# Patient Record
Sex: FEMALE | Race: WHITE | NOT HISPANIC OR LATINO | Employment: UNEMPLOYED | URBAN - METROPOLITAN AREA
[De-identification: names, ages, dates, MRNs, and addresses within clinical notes are randomized per-mention and may not be internally consistent; named-entity substitution may affect disease eponyms.]

---

## 2018-10-12 ENCOUNTER — NON-PROVIDER VISIT (OUTPATIENT)
Dept: OCCUPATIONAL MEDICINE | Facility: CLINIC | Age: 38
End: 2018-10-12

## 2018-10-12 DIAGNOSIS — Z11.1 ENCOUNTER FOR PPD TEST: Primary | ICD-10-CM

## 2018-10-12 PROCEDURE — 86580 TB INTRADERMAL TEST: CPT | Performed by: PREVENTIVE MEDICINE

## 2018-10-15 ENCOUNTER — NON-PROVIDER VISIT (OUTPATIENT)
Dept: OCCUPATIONAL MEDICINE | Facility: CLINIC | Age: 38
End: 2018-10-15

## 2018-10-15 LAB — TB WHEAL 3D P 5 TU DIAM: NORMAL MM

## 2018-10-15 NOTE — PROGRESS NOTES
Martine Godinez is a 38 y.o. female here for a non-provider visit for PPD reading -- Step 1 of 2.      1.  Resulted in Epic under enter/edit results? Yes   2.  TB evaluation questionnaire scanned into chart and original given to patient?No      3. Was induration greater than 0 mm? No.

## 2018-10-19 ENCOUNTER — NON-PROVIDER VISIT (OUTPATIENT)
Dept: OCCUPATIONAL MEDICINE | Facility: CLINIC | Age: 38
End: 2018-10-19

## 2018-10-19 DIAGNOSIS — Z11.1 ENCOUNTER FOR PPD TEST: ICD-10-CM

## 2018-10-19 PROCEDURE — 86580 TB INTRADERMAL TEST: CPT | Performed by: PREVENTIVE MEDICINE

## 2018-10-22 ENCOUNTER — NON-PROVIDER VISIT (OUTPATIENT)
Dept: OCCUPATIONAL MEDICINE | Facility: CLINIC | Age: 38
End: 2018-10-22

## 2018-10-23 LAB — TB WHEAL 3D P 5 TU DIAM: NORMAL MM

## 2020-11-19 ENCOUNTER — HOSPITAL ENCOUNTER (OUTPATIENT)
Facility: MEDICAL CENTER | Age: 40
End: 2020-11-19
Attending: SURGERY
Payer: MEDICAID

## 2020-11-20 LAB
FORWARD REASON: SPWHY: NORMAL
FORWARDED TO LAB: SPWHR: NORMAL
SPECIMEN SENT: SPWT1: NORMAL

## 2022-08-18 ENCOUNTER — HOSPITAL ENCOUNTER (EMERGENCY)
Facility: MEDICAL CENTER | Age: 42
End: 2022-08-19
Attending: STUDENT IN AN ORGANIZED HEALTH CARE EDUCATION/TRAINING PROGRAM
Payer: COMMERCIAL

## 2022-08-18 DIAGNOSIS — D72.829 LEUKOCYTOSIS, UNSPECIFIED TYPE: ICD-10-CM

## 2022-08-18 DIAGNOSIS — E87.6 HYPOKALEMIA: ICD-10-CM

## 2022-08-18 DIAGNOSIS — G44.89 OTHER HEADACHE SYNDROME: ICD-10-CM

## 2022-08-18 DIAGNOSIS — I10 HYPERTENSION, UNSPECIFIED TYPE: ICD-10-CM

## 2022-08-18 LAB — EKG IMPRESSION: NORMAL

## 2022-08-18 PROCEDURE — 93005 ELECTROCARDIOGRAM TRACING: CPT

## 2022-08-18 PROCEDURE — 99284 EMERGENCY DEPT VISIT MOD MDM: CPT

## 2022-08-18 PROCEDURE — 93005 ELECTROCARDIOGRAM TRACING: CPT | Performed by: STUDENT IN AN ORGANIZED HEALTH CARE EDUCATION/TRAINING PROGRAM

## 2022-08-19 ENCOUNTER — APPOINTMENT (OUTPATIENT)
Dept: RADIOLOGY | Facility: MEDICAL CENTER | Age: 42
End: 2022-08-19
Attending: STUDENT IN AN ORGANIZED HEALTH CARE EDUCATION/TRAINING PROGRAM
Payer: COMMERCIAL

## 2022-08-19 VITALS
RESPIRATION RATE: 16 BRPM | HEART RATE: 62 BPM | TEMPERATURE: 98.4 F | SYSTOLIC BLOOD PRESSURE: 149 MMHG | HEIGHT: 65 IN | OXYGEN SATURATION: 96 % | WEIGHT: 293 LBS | DIASTOLIC BLOOD PRESSURE: 72 MMHG | BODY MASS INDEX: 48.82 KG/M2

## 2022-08-19 LAB
ALBUMIN SERPL BCP-MCNC: 4.3 G/DL (ref 3.2–4.9)
ALBUMIN/GLOB SERPL: 1.4 G/DL
ALP SERPL-CCNC: 48 U/L (ref 30–99)
ALT SERPL-CCNC: 20 U/L (ref 2–50)
ANION GAP SERPL CALC-SCNC: 12 MMOL/L (ref 7–16)
AST SERPL-CCNC: 24 U/L (ref 12–45)
BILIRUB SERPL-MCNC: 0.5 MG/DL (ref 0.1–1.5)
BUN SERPL-MCNC: 10 MG/DL (ref 8–22)
CALCIUM SERPL-MCNC: 9.6 MG/DL (ref 8.5–10.5)
CHLORIDE SERPL-SCNC: 101 MMOL/L (ref 96–112)
CO2 SERPL-SCNC: 27 MMOL/L (ref 20–33)
CREAT SERPL-MCNC: 0.68 MG/DL (ref 0.5–1.4)
ERYTHROCYTE [DISTWIDTH] IN BLOOD BY AUTOMATED COUNT: 38.1 FL (ref 35.9–50)
GFR SERPLBLD CREATININE-BSD FMLA CKD-EPI: 113 ML/MIN/1.73 M 2
GLOBULIN SER CALC-MCNC: 3.1 G/DL (ref 1.9–3.5)
GLUCOSE SERPL-MCNC: 104 MG/DL (ref 65–99)
HCT VFR BLD AUTO: 44 % (ref 37–47)
HGB BLD-MCNC: 15.2 G/DL (ref 12–16)
MCH RBC QN AUTO: 28 PG (ref 27–33)
MCHC RBC AUTO-ENTMCNC: 34.5 G/DL (ref 33.6–35)
MCV RBC AUTO: 81.2 FL (ref 81.4–97.8)
PLATELET # BLD AUTO: 300 K/UL (ref 164–446)
PMV BLD AUTO: 9.4 FL (ref 9–12.9)
POTASSIUM SERPL-SCNC: 3.5 MMOL/L (ref 3.6–5.5)
PROT SERPL-MCNC: 7.4 G/DL (ref 6–8.2)
RBC # BLD AUTO: 5.42 M/UL (ref 4.2–5.4)
SODIUM SERPL-SCNC: 140 MMOL/L (ref 135–145)
TROPONIN T SERPL-MCNC: <6 NG/L (ref 6–19)
WBC # BLD AUTO: 11.1 K/UL (ref 4.8–10.8)

## 2022-08-19 PROCEDURE — 70450 CT HEAD/BRAIN W/O DYE: CPT

## 2022-08-19 PROCEDURE — 71045 X-RAY EXAM CHEST 1 VIEW: CPT

## 2022-08-19 PROCEDURE — 80053 COMPREHEN METABOLIC PANEL: CPT

## 2022-08-19 PROCEDURE — A9270 NON-COVERED ITEM OR SERVICE: HCPCS | Performed by: STUDENT IN AN ORGANIZED HEALTH CARE EDUCATION/TRAINING PROGRAM

## 2022-08-19 PROCEDURE — 85027 COMPLETE CBC AUTOMATED: CPT

## 2022-08-19 PROCEDURE — 700111 HCHG RX REV CODE 636 W/ 250 OVERRIDE (IP): Performed by: STUDENT IN AN ORGANIZED HEALTH CARE EDUCATION/TRAINING PROGRAM

## 2022-08-19 PROCEDURE — 96374 THER/PROPH/DIAG INJ IV PUSH: CPT

## 2022-08-19 PROCEDURE — 36415 COLL VENOUS BLD VENIPUNCTURE: CPT

## 2022-08-19 PROCEDURE — 700102 HCHG RX REV CODE 250 W/ 637 OVERRIDE(OP): Performed by: STUDENT IN AN ORGANIZED HEALTH CARE EDUCATION/TRAINING PROGRAM

## 2022-08-19 PROCEDURE — 84484 ASSAY OF TROPONIN QUANT: CPT

## 2022-08-19 RX ORDER — ACETAMINOPHEN 325 MG/1
650 TABLET ORAL ONCE
Status: COMPLETED | OUTPATIENT
Start: 2022-08-19 | End: 2022-08-19

## 2022-08-19 RX ORDER — AMLODIPINE BESYLATE 5 MG/1
5 TABLET ORAL ONCE
Status: COMPLETED | OUTPATIENT
Start: 2022-08-19 | End: 2022-08-19

## 2022-08-19 RX ORDER — LISINOPRIL 20 MG/1
40 TABLET ORAL ONCE
Status: COMPLETED | OUTPATIENT
Start: 2022-08-19 | End: 2022-08-19

## 2022-08-19 RX ORDER — AMLODIPINE BESYLATE 5 MG/1
5 TABLET ORAL DAILY
Qty: 30 TABLET | Refills: 0 | Status: SHIPPED | OUTPATIENT
Start: 2022-08-19 | End: 2023-08-10

## 2022-08-19 RX ORDER — ONDANSETRON 2 MG/ML
4 INJECTION INTRAMUSCULAR; INTRAVENOUS ONCE
Status: COMPLETED | OUTPATIENT
Start: 2022-08-19 | End: 2022-08-19

## 2022-08-19 RX ADMIN — AMLODIPINE BESYLATE 5 MG: 5 TABLET ORAL at 00:24

## 2022-08-19 RX ADMIN — ACETAMINOPHEN 650 MG: 325 TABLET, FILM COATED ORAL at 00:24

## 2022-08-19 RX ADMIN — ONDANSETRON 4 MG: 2 INJECTION INTRAMUSCULAR; INTRAVENOUS at 00:31

## 2022-08-19 RX ADMIN — LISINOPRIL 40 MG: 20 TABLET ORAL at 00:24

## 2022-08-19 NOTE — DISCHARGE INSTRUCTIONS
Your labs and CT scan were all normal.  Please follow-up with your primary doctor this week discuss medication for your high blood pressure.  Please return to the emergency department if you have worsening headache, chest pain, shortness of breath or any new or worsening concern.

## 2022-08-19 NOTE — ED PROVIDER NOTES
CHIEF COMPLAINT  Chief Complaint   Patient presents with    Hypertension     Pt has hx of hypertension and was restarted on medication on August 4th, states she hasn't missed any doses. Pt states she has had a headache all day and took her BP at home and it was 180/111. Pt denies CP, SOB and N/V.        HPI  Martine Godinez is a 42 y.o. female who has a history of hypertension presents for evaluation of hypertension.     She states that she has had a mild headache all day since about noon.  It started in the back of her head and radiates down her neck.  It was not maximal at onset.  No associated vomiting or photophobia.  States she does feel slightly nauseated.  She now describes it as more frontal in nature, constant and has worsened somewhat since 6 PM but is coming and going in severity.  It is dull in nature.  She also has had about 1 hour of sharp left-sided chest pain which does not radiate to her arm or back.  She has had this pain once before a couple months ago and resolved without intervention.  She denies any associated shortness of breath, vomiting or diaphoresis.  It is not worse with exertion.  States that she checked her blood pressure this evening and noted that it was above 180 systolic.  She checked it multiple times and it was persistently elevated.  She is taking lisinopril 40 mg daily but did not take her evening dose.    Denies numbness, unilateral weakness, facial asymmetry, speech changes.  No dysphagia, dysarthria, diplopia. No difficulty with gait or coordination.      REVIEW OF SYSTEMS  As per HPI, otherwise a 10 point review of systems is negative    PAST MEDICAL HISTORY  Past Medical History:   Diagnosis Date    ADHD (attention deficit hyperactivity disorder)     Hypertension     Migraine     Psychiatric disorder Bipolar       SOCIAL HISTORY  Social History     Tobacco Use    Smoking status: Former     Packs/day: 1.25     Years: 12.00     Pack years: 15.00     Types: Cigarettes      "Quit date: 2011     Years since quittin.3    Smokeless tobacco: Never    Tobacco comments:     1ppd   Substance Use Topics    Alcohol use: No    Drug use: Yes     Types: Inhaled     Comment: marijuana       SURGICAL HISTORY  Past Surgical History:   Procedure Laterality Date    TUBAL COAGULATION LAPAROSCOPIC BILATERAL  2011    [facility]    SOFIA BY LAPAROSCOPY  10/4/08    [facility] W[facility]IM[facility]EL [facility]    [place] REMOVAL OF TONSILS,<11 Y/O         CURRENT MEDICATIONS  Home Medications       Reviewed by Martha Galvez R.N. (Registered Nurse) on 22 at 0259  Med List Status: Not Addressed     Medication Last Dose Status   gabapentin (NEURONTIN) 400 MG CAPS  Active   insulin aspart (NOVOLOG) 100 UNIT/ML SOLN  Active   insulin aspart (NOVOLOG) 100 UNIT/ML SOLN  Active   Insulin Aspart Prot & Aspart (70-30) 100 UNIT/ML SUPN  Active   insulin glargine (LANTUS) 100 UNIT/ML SOLN  Active   insulin glargine (LANTUS) 100 UNIT/ML SOPN injection  Active   lisinopril (PRINIVIL) 20 MG TABS  Active                    ALLERGIES  Allergies   Allergen Reactions    Cefzil [Cefprozil] Hives    Morphine Vomiting    Sulfa Drugs Hives    Tegretol [Carbamazepine] Rash and Itching       PHYSICAL EXAM  VITAL SIGNS: BP (!) 178/100   Pulse 71   Temp 36.3 °C (97.3 °F) (Temporal)   Resp 20   Ht 1.651 m (5' 5\")   Wt (!) 146 kg (320 lb 15.8 oz)   SpO2 97%   BMI 53.42 kg/m²    Constitutional: Awake and alert . Well appearing   HENT: Normal inspection  Eyes: Normal inspection  Neck: Grossly normal range of motion.  Cardiovascular: Normal heart rate, Normal rhythm.  Symmetric peripheral pulses.   Thorax & Lungs: No respiratory distress, No wheezing, No rales, No rhonchi, No chest tenderness.   Abdomen: Soft, non-distended, nontender, no mass  Skin: No obvious rash.  Back: No tenderness, No CVA tenderness.   Extremities: Warm, well perfused. No " clubbing, cyanosis, edema,   Neurologic: CN II-XII tested and intact.  Sensation intact to light touch in all extremities.  Motor: Normal tone and bulk. No abnormal movements appreciated. No pronator drift. Strength tested and 5/5 in bilateral wrist flexion/extension, elbow flexion/extension, shoulder abduction, straight leg raise, knee flexion/extension, ankle dorsiflexion/plantarflexion. Patient ambulates with a steady gait.  Coordination: Finger to nose and heel to shin testing intact bilaterally.  Psychiatric: Normal for situation    RADIOLOGY/PROCEDURES  CT-HEAD W/O   Final Result         1.  No acute intracranial abnormality.         DX-CHEST-PORTABLE (1 VIEW)   Final Result         1.  Left basilar atelectasis, no focal infiltrate           Imaging is interpreted by radiologist    Labs:  Results for orders placed or performed during the hospital encounter of 08/18/22   CBC WITHOUT DIFFERENTIAL   Result Value Ref Range    WBC 11.1 (H) 4.8 - 10.8 K/uL    RBC 5.42 (H) 4.20 - 5.40 M/uL    Hemoglobin 15.2 12.0 - 16.0 g/dL    Hematocrit 44.0 37.0 - 47.0 %    MCV 81.2 (L) 81.4 - 97.8 fL    MCH 28.0 27.0 - 33.0 pg    MCHC 34.5 33.6 - 35.0 g/dL    RDW 38.1 35.9 - 50.0 fL    Platelet Count 300 164 - 446 K/uL    MPV 9.4 9.0 - 12.9 fL   COMP METABOLIC PANEL   Result Value Ref Range    Sodium 140 135 - 145 mmol/L    Potassium 3.5 (L) 3.6 - 5.5 mmol/L    Chloride 101 96 - 112 mmol/L    Co2 27 20 - 33 mmol/L    Anion Gap 12.0 7.0 - 16.0    Glucose 104 (H) 65 - 99 mg/dL    Bun 10 8 - 22 mg/dL    Creatinine 0.68 0.50 - 1.40 mg/dL    Calcium 9.6 8.5 - 10.5 mg/dL    AST(SGOT) 24 12 - 45 U/L    ALT(SGPT) 20 2 - 50 U/L    Alkaline Phosphatase 48 30 - 99 U/L    Total Bilirubin 0.5 0.1 - 1.5 mg/dL    Albumin 4.3 3.2 - 4.9 g/dL    Total Protein 7.4 6.0 - 8.2 g/dL    Globulin 3.1 1.9 - 3.5 g/dL    A-G Ratio 1.4 g/dL   TROPONIN   Result Value Ref Range    Troponin T <6 6 - 19 ng/L   ESTIMATED GFR   Result Value Ref Range    GFR  (CKD-EPI) 113 >60 mL/min/1.73 m 2   EKG (NOW)   Result Value Ref Range    Report       [facility] Emergency Dept.    Test Date:  2022  Pt Name:    YASMIN DOWNS Department: ER  MRN:        [id number]                      Room:  Gender:     Female                       Technician: [id number]  :        1980                   Requested By:ER TRIAGE PROTOCOL  Order #:    [id number]                    Reading MD:    Measurements  Intervals                                Axis  Rate:       64                           P:          46  PA:         173                          QRS:        64  QRSD:       106                          T:          43  QT:         456  QTc:        471    Interpretive Statements  Sinus rhythm  RSR' in V1 or V2, probably normal variant  ST elev, probable normal early repol pattern  Baseline wander in lead(s) V1  Compared to ECG 2011 18:32:47  RSR' in V1 or V2 now present  ST (T wave) deviation now present  Inferior Q waves no longer present  Q waves no longer present       The EKG was reviewed by me and interpreted as documented above      Medications   acetaminophen (Tylenol) tablet 650 mg (650 mg Oral Given 22)   lisinopril (PRINIVIL) tablet 40 mg (40 mg Oral Given 22)   amLODIPine (NORVASC) tablet 5 mg (5 mg Oral Given 22)   ondansetron (ZOFRAN) syringe/vial injection 4 mg (4 mg Intravenous Given 22)       COURSE & MEDICAL DECISION MAKING  12:00 AM Patient was evaluated at bedside. Ordered for an EKG, CT-Head, DX-Chest, Troponin, CMP, and CBC with diff. Patient will be treated with tylenol 650 mg for her headache. She will be given amlodipine 5 mg tablet and lisinopril 40 mg tablet for her hypertension. Zofran 4 mg was given for her nausea. I informed the patient of my plan to run diagnostic studies to evaluate their symptoms. Patient verbalizes understanding and support with my plan of care.     This is a  42-year-old with a history of hypertension who presents with headache, chest pain and elevated blood pressure readings at home.  She does arrive hypertensive but is well-appearing with a normal mental status.  Neurological exam is all normal.  I overall have low suspicion for subarachnoid hemorrhage given that her headache was not maximal at onset and is only mild currently.  CT head obtained without evidence of intracranial hemorrhage and I do not see an indication for advanced vessel imaging or LP at this time.  The remainder of her labs are reassuring. EKG is not ischemic.  She is not exhibiting any signs or symptoms to suggest hypertensive emergency including but not limited to  ACS, aortic pathology, renal injury or heart failure. Pulmonary embolism considered but unlikely in this clinical context. Patient was given her evening dose of lisinopril in addition to 5 mg of amlodipine.  On reevaluation their BP has improved to the 140s systolic she feels overall improved.  I stressed the importance of close PCP follow-up in order to assist with ongoing BP management. They express understanding or strict ER return precautions.     FINAL IMPRESSION  1. Hypokalemia Acute       2. Other headache syndrome Acute       3. Hypertension, unspecified type Acute amLODIPine (NORVASC) 5 MG Tab      4. Leukocytosis, unspecified type                This dictation was created using voice recognition software. The accuracy of the dictation is limited to the abilities of the software.  The nursing notes were reviewed and certain aspects of this information were incorporated into this note.      Electronically signed by: Crystal Caban, 8/19/2022 12:05 AM

## 2022-08-19 NOTE — ED TRIAGE NOTES
"Chief Complaint   Patient presents with    Hypertension     Pt has hx of hypertension and was restarted on medication on August 4th, states she hasn't missed any doses. Pt states she has had a headache all day and took her BP at home and it was 180/111. Pt denies CP, SOB and N/V.        43 yo F to triage for above complaint.     Pt is alert and oriented, speaking in full sentences, follows commands and responds appropriately to questions. NAD. Resp are even and unlabored.      Pt placed in lobby. Pt educated on triage process. Pt encouraged to alert staff for any changes.     Patient and staff wearing appropriate PPE    BP (!) 188/107   Pulse 94   Temp 36.3 °C (97.3 °F) (Temporal)   Resp 18   Ht 1.651 m (5' 5\")   Wt (!) 146 kg (320 lb 15.8 oz)   SpO2 97%   BMI 53.42 kg/m²     "

## 2022-11-22 ENCOUNTER — OFFICE VISIT (OUTPATIENT)
Dept: URGENT CARE | Facility: CLINIC | Age: 42
End: 2022-11-22
Payer: COMMERCIAL

## 2022-11-22 VITALS
BODY MASS INDEX: 48.82 KG/M2 | WEIGHT: 293 LBS | HEIGHT: 65 IN | HEART RATE: 96 BPM | TEMPERATURE: 97 F | SYSTOLIC BLOOD PRESSURE: 176 MMHG | RESPIRATION RATE: 16 BRPM | DIASTOLIC BLOOD PRESSURE: 90 MMHG | OXYGEN SATURATION: 96 %

## 2022-11-22 DIAGNOSIS — I10 HYPERTENSION, UNSPECIFIED TYPE: ICD-10-CM

## 2022-11-22 DIAGNOSIS — R09.81 SINUS CONGESTION: ICD-10-CM

## 2022-11-22 DIAGNOSIS — J01.00 ACUTE MAXILLARY SINUSITIS, RECURRENCE NOT SPECIFIED: ICD-10-CM

## 2022-11-22 DIAGNOSIS — R05.2 SUBACUTE COUGH: ICD-10-CM

## 2022-11-22 PROCEDURE — 99203 OFFICE O/P NEW LOW 30 MIN: CPT | Performed by: PHYSICIAN ASSISTANT

## 2022-11-22 RX ORDER — DEXTROMETHORPHAN HYDROBROMIDE AND PROMETHAZINE HYDROCHLORIDE 15; 6.25 MG/5ML; MG/5ML
5 SYRUP ORAL EVERY 4 HOURS PRN
Qty: 120 ML | Refills: 0 | Status: SHIPPED | OUTPATIENT
Start: 2022-11-22 | End: 2023-04-24

## 2022-11-22 RX ORDER — AMOXICILLIN AND CLAVULANATE POTASSIUM 875; 125 MG/1; MG/1
1 TABLET, FILM COATED ORAL 2 TIMES DAILY
Qty: 20 TABLET | Refills: 0 | Status: SHIPPED | OUTPATIENT
Start: 2022-11-22 | End: 2023-04-24

## 2022-11-22 RX ORDER — BENZONATATE 100 MG/1
100 CAPSULE ORAL 3 TIMES DAILY PRN
Qty: 60 CAPSULE | Refills: 0 | Status: SHIPPED | OUTPATIENT
Start: 2022-11-22 | End: 2023-04-24

## 2022-11-22 RX ORDER — METHYLPREDNISOLONE 4 MG/1
TABLET ORAL
Qty: 1 EACH | Refills: 0 | Status: SHIPPED | OUTPATIENT
Start: 2022-11-22 | End: 2023-04-24

## 2022-11-22 ASSESSMENT — ENCOUNTER SYMPTOMS
CHILLS: 0
NAUSEA: 0
COUGH: 1
SPUTUM PRODUCTION: 1
FEVER: 0
SHORTNESS OF BREATH: 0
ABDOMINAL PAIN: 0
DIARRHEA: 0
SORE THROAT: 0
SINUS PAIN: 1
WHEEZING: 0
VOMITING: 0

## 2022-11-22 ASSESSMENT — FIBROSIS 4 INDEX: FIB4 SCORE: 0.72

## 2022-11-22 NOTE — PROGRESS NOTES
Subjective:     Martine Godinez  is a 42 y.o. female who presents for Cough (Pt has a cough, congestion, ear discomfort x 2 weeks getting worse )      Cough  Pertinent negatives include no chest pain, chills, ear pain, fever, rash, sore throat, shortness of breath or wheezing. Her past medical history is significant for environmental allergies. Patient presents urgent care noting last 2 to 3 weeks of sinus pressure and congestion concerning for sinusitis.  Patient notes past medical history of sinusitis and suspects once again.  Denies fevers or chills.  Complains of fullness in the right greater than left ear.  Denies ear drainage.  Notes past medical history of allergic rhinitis.  Patient planes of sinus pressure pain.  Notes purulent nasal drainage as well as productive coughing.  Patient denies nausea vomiting abdominal pain diarrhea or rash.  Patient is initially found to be hypertensive in clinic today.  She notes past medical history of hypertension has been managed with medications.  She is currently off her meds for just over 1 week.  She is recently returned to [facility] having had a number of personal and family health issues.  She is yet awaiting picking up her blood pressure medication.  Patient denies headaches visual changes chest pain or shortness of breath.    Review of Systems   Constitutional:  Negative for chills and fever.   HENT:  Positive for congestion and sinus pain. Negative for ear pain and sore throat.    Respiratory:  Positive for cough and sputum production. Negative for shortness of breath and wheezing.    Cardiovascular:  Negative for chest pain.   Gastrointestinal:  Negative for abdominal pain, diarrhea, nausea and vomiting.   Skin:  Negative for rash.   Endo/Heme/Allergies:  Positive for environmental allergies.     Medications:    amLODIPine Tabs  gabapentin Caps  Insulin Aspart Prot & Aspart Supn  insulin aspart Soln  insulin glargine Soln  insulin glargine Sopn  lisinopril  "Tabs    Allergies: Carbamazepine, Cefzil [cefprozil], Morphine, and Sulfa drugs    Problem List: Martine Godinez does not have a problem list on file.    Surgical History:  Past Surgical History:   Procedure Laterality Date    TUBAL COAGULATION LAPAROSCOPIC BILATERAL  9/2/2011    Per[facility] KEMI GREGG [facility]    SOFIA BY LAPAROSCOPY  10/4/08    [facility]    [place] REMOVAL OF TONSILS,<11 Y/O         Past Social Hx: Martine Godinez  reports that she quit smoking about 11 years ago. Her smoking use included cigarettes. She has a 15.00 pack-year smoking history. She has never used smokeless tobacco. She reports current drug use. Drugs: Inhaled and Marijuana. She reports that she does not drink alcohol.     Past Family Hx:  Martine Godinez family history is not on file.     Problem list, medications, and allergies reviewed by myself today in Epic.     Objective:   BP (!) 176/90 (BP Location: Left arm, Patient Position: Sitting, BP Cuff Size: Large adult)   Pulse 96   Temp 36.1 °C (97 °F) (Temporal)   Resp 16   Ht 1.651 m (5' 5\")   Wt (!) 152 kg (334 lb 3.2 oz)   SpO2 96%   BMI 55.61 kg/m²     Physical Exam  Vitals and nursing note reviewed.   Constitutional:       General: She is not in acute distress.     Appearance: She is well-developed. She is not diaphoretic.   HENT:      Head: Normocephalic and atraumatic.      Right Ear: Ear canal and external ear normal. Tympanic membrane is bulging. Tympanic membrane is not erythematous.      Left Ear: Ear canal and external ear normal. Tympanic membrane is bulging. Tympanic membrane is not erythematous.      Nose: Congestion present.      Right Sinus: Maxillary sinus tenderness present. No frontal sinus tenderness.      Left Sinus: Maxillary sinus tenderness present. No frontal sinus tenderness.      Mouth/Throat:      Lips: Pink.      Mouth: Mucous membranes are moist.      " Pharynx: Uvula midline. Posterior oropharyngeal erythema ( PND) present. No oropharyngeal exudate or uvula swelling.      Tonsils: No tonsillar abscesses.   Eyes:      General: Lids are normal. No scleral icterus.        Right eye: No discharge.         Left eye: No discharge.      Conjunctiva/sclera: Conjunctivae normal.   Cardiovascular:      Rate and Rhythm: Normal rate and regular rhythm.   Pulmonary:      Effort: Pulmonary effort is normal. No respiratory distress.      Breath sounds: Normal breath sounds. No stridor. No decreased breath sounds, wheezing, rhonchi or rales.   Musculoskeletal:         General: Normal range of motion.      Cervical back: Neck supple.   Skin:     General: Skin is warm and dry.      Coloration: Skin is not pale.      Findings: No erythema.   Neurological:      Mental Status: She is alert and oriented to person, place, and time. She is not disoriented.   Psychiatric:         Speech: Speech normal.         Behavior: Behavior normal.       Assessment/Plan:   Assessment      1. Acute maxillary sinusitis, recurrence not specified  - methylPREDNISolone (MEDROL DOSEPAK) 4 MG Tablet Therapy Pack; Take as directed on package.  Dispense one package.  Dispense: 1 Each; Refill: 0  - amoxicillin-clavulanate (AUGMENTIN) 875-125 MG Tab; Take 1 Tablet by mouth 2 times a day.  Dispense: 20 Tablet; Refill: 0    2. Sinus congestion    3. Hypertension, unspecified type    4. Subacute cough  - benzonatate (TESSALON) 100 MG Cap; Take 1 Capsule by mouth 3 times a day as needed for Cough.  Dispense: 60 Capsule; Refill: 0  - promethazine-dextromethorphan (PROMETHAZINE-DM) 6.25-15 MG/5ML syrup; Take 5 mL by mouth every four hours as needed for Cough.  Dispense: 120 mL; Refill: 0  Supportive care is reviewed with patient/caregiver - recommend to push PO fluids and electrolytes, Nsaids/tylenol, netti pot/saline irrig, humidifier in home, flonase  Cautioned regarding potential for sedation with  medication.  Cautioned regarding potential side effects of steroid, avoid nsaids while using   take full course of Rx, take with probiotics, observe for resolution  Return to clinic with lack of resolution or progression of symptoms.    I have worn an N95 mask, gloves and eye protection for the entire encounter with this patient.     Differential diagnosis, natural history, supportive care, and indications for immediate follow-up discussed.

## 2023-04-20 ENCOUNTER — APPOINTMENT (OUTPATIENT)
Dept: ADMISSIONS | Facility: MEDICAL CENTER | Age: 43
End: 2023-04-20
Attending: NEUROLOGICAL SURGERY
Payer: MEDICAID

## 2023-04-24 ENCOUNTER — PRE-ADMISSION TESTING (OUTPATIENT)
Dept: ADMISSIONS | Facility: MEDICAL CENTER | Age: 43
End: 2023-04-24
Attending: NEUROLOGICAL SURGERY
Payer: MEDICAID

## 2023-04-24 ENCOUNTER — HOSPITAL ENCOUNTER (OUTPATIENT)
Dept: RADIOLOGY | Facility: MEDICAL CENTER | Age: 43
End: 2023-04-24
Attending: NEUROLOGICAL SURGERY | Admitting: NEUROLOGICAL SURGERY
Payer: MEDICAID

## 2023-04-24 DIAGNOSIS — Z01.812 PRE-OPERATIVE LABORATORY EXAMINATION: ICD-10-CM

## 2023-04-24 DIAGNOSIS — Z01.811 PRE-OPERATIVE RESPIRATORY EXAMINATION: ICD-10-CM

## 2023-04-24 DIAGNOSIS — Z01.810 PRE-OPERATIVE CARDIOVASCULAR EXAMINATION: ICD-10-CM

## 2023-04-24 LAB
ANION GAP SERPL CALC-SCNC: 10 MMOL/L (ref 7–16)
APTT PPP: 31.7 SEC (ref 24.7–36)
BASOPHILS # BLD AUTO: 0.5 % (ref 0–1.8)
BASOPHILS # BLD: 0.04 K/UL (ref 0–0.12)
BUN SERPL-MCNC: 8 MG/DL (ref 8–22)
CALCIUM SERPL-MCNC: 9.3 MG/DL (ref 8.5–10.5)
CHLORIDE SERPL-SCNC: 105 MMOL/L (ref 96–112)
CO2 SERPL-SCNC: 26 MMOL/L (ref 20–33)
CREAT SERPL-MCNC: 0.57 MG/DL (ref 0.5–1.4)
EKG IMPRESSION: NORMAL
EOSINOPHIL # BLD AUTO: 0.23 K/UL (ref 0–0.51)
EOSINOPHIL NFR BLD: 2.7 % (ref 0–6.9)
ERYTHROCYTE [DISTWIDTH] IN BLOOD BY AUTOMATED COUNT: 42.5 FL (ref 35.9–50)
EST. AVERAGE GLUCOSE BLD GHB EST-MCNC: 128 MG/DL
GFR SERPLBLD CREATININE-BSD FMLA CKD-EPI: 117 ML/MIN/1.73 M 2
GLUCOSE SERPL-MCNC: 96 MG/DL (ref 65–99)
HBA1C MFR BLD: 6.1 % (ref 4–5.6)
HCT VFR BLD AUTO: 42.5 % (ref 37–47)
HGB BLD-MCNC: 13.9 G/DL (ref 12–16)
IMM GRANULOCYTES # BLD AUTO: 0.02 K/UL (ref 0–0.11)
IMM GRANULOCYTES NFR BLD AUTO: 0.2 % (ref 0–0.9)
INR PPP: 1.06 (ref 0.87–1.13)
LYMPHOCYTES # BLD AUTO: 3.19 K/UL (ref 1–4.8)
LYMPHOCYTES NFR BLD: 38.1 % (ref 22–41)
MCH RBC QN AUTO: 26.3 PG (ref 27–33)
MCHC RBC AUTO-ENTMCNC: 32.7 G/DL (ref 33.6–35)
MCV RBC AUTO: 80.5 FL (ref 81.4–97.8)
MONOCYTES # BLD AUTO: 0.48 K/UL (ref 0–0.85)
MONOCYTES NFR BLD AUTO: 5.7 % (ref 0–13.4)
NEUTROPHILS # BLD AUTO: 4.42 K/UL (ref 2–7.15)
NEUTROPHILS NFR BLD: 52.8 % (ref 44–72)
NRBC # BLD AUTO: 0 K/UL
NRBC BLD-RTO: 0 /100 WBC
PLATELET # BLD AUTO: 266 K/UL (ref 164–446)
PMV BLD AUTO: 10.5 FL (ref 9–12.9)
POTASSIUM SERPL-SCNC: 4.1 MMOL/L (ref 3.6–5.5)
PROTHROMBIN TIME: 13.7 SEC (ref 12–14.6)
RBC # BLD AUTO: 5.28 M/UL (ref 4.2–5.4)
SODIUM SERPL-SCNC: 141 MMOL/L (ref 135–145)
WBC # BLD AUTO: 8.4 K/UL (ref 4.8–10.8)

## 2023-04-24 PROCEDURE — 71046 X-RAY EXAM CHEST 2 VIEWS: CPT

## 2023-04-24 PROCEDURE — 85610 PROTHROMBIN TIME: CPT

## 2023-04-24 PROCEDURE — 36415 COLL VENOUS BLD VENIPUNCTURE: CPT

## 2023-04-24 PROCEDURE — 83036 HEMOGLOBIN GLYCOSYLATED A1C: CPT

## 2023-04-24 PROCEDURE — 85025 COMPLETE CBC W/AUTO DIFF WBC: CPT

## 2023-04-24 PROCEDURE — 93005 ELECTROCARDIOGRAM TRACING: CPT

## 2023-04-24 PROCEDURE — 93010 ELECTROCARDIOGRAM REPORT: CPT | Performed by: INTERNAL MEDICINE

## 2023-04-24 PROCEDURE — 85730 THROMBOPLASTIN TIME PARTIAL: CPT

## 2023-04-24 PROCEDURE — 80048 BASIC METABOLIC PNL TOTAL CA: CPT

## 2023-04-24 RX ORDER — INSULIN GLARGINE-YFGN 100 [IU]/ML
8 INJECTION, SOLUTION SUBCUTANEOUS
COMMUNITY
Start: 2023-03-30 | End: 2023-08-10

## 2023-04-24 RX ORDER — BUPROPION HYDROCHLORIDE 150 MG/1
150 TABLET, EXTENDED RELEASE ORAL 2 TIMES DAILY
Status: ON HOLD | COMMUNITY
Start: 2022-08-03 | End: 2023-08-14

## 2023-04-27 ENCOUNTER — ANESTHESIA EVENT (OUTPATIENT)
Dept: SURGERY | Facility: MEDICAL CENTER | Age: 43
End: 2023-04-27
Payer: MEDICAID

## 2023-04-27 ENCOUNTER — APPOINTMENT (OUTPATIENT)
Dept: RADIOLOGY | Facility: MEDICAL CENTER | Age: 43
End: 2023-04-27
Attending: NEUROLOGICAL SURGERY
Payer: MEDICAID

## 2023-04-27 ENCOUNTER — ANESTHESIA (OUTPATIENT)
Dept: SURGERY | Facility: MEDICAL CENTER | Age: 43
End: 2023-04-27
Payer: MEDICAID

## 2023-04-27 ENCOUNTER — HOSPITAL ENCOUNTER (OUTPATIENT)
Facility: MEDICAL CENTER | Age: 43
End: 2023-04-28
Attending: NEUROLOGICAL SURGERY | Admitting: NEUROLOGICAL SURGERY
Payer: MEDICAID

## 2023-04-27 DIAGNOSIS — G89.18 ACUTE POST-OPERATIVE PAIN: ICD-10-CM

## 2023-04-27 PROBLEM — N88.2 CERVICAL STENOSIS (UTERINE CERVIX): Status: ACTIVE | Noted: 2023-04-27

## 2023-04-27 LAB
GLUCOSE BLD STRIP.AUTO-MCNC: 104 MG/DL (ref 65–99)
GLUCOSE BLD STRIP.AUTO-MCNC: 195 MG/DL (ref 65–99)
HCG UR QL: NEGATIVE

## 2023-04-27 PROCEDURE — 700111 HCHG RX REV CODE 636 W/ 250 OVERRIDE (IP): Mod: UD | Performed by: NURSE PRACTITIONER

## 2023-04-27 PROCEDURE — 72040 X-RAY EXAM NECK SPINE 2-3 VW: CPT

## 2023-04-27 PROCEDURE — 160009 HCHG ANES TIME/MIN: Performed by: NEUROLOGICAL SURGERY

## 2023-04-27 PROCEDURE — A9270 NON-COVERED ITEM OR SERVICE: HCPCS | Mod: UD | Performed by: NURSE PRACTITIONER

## 2023-04-27 PROCEDURE — 96365 THER/PROPH/DIAG IV INF INIT: CPT

## 2023-04-27 PROCEDURE — 96375 TX/PRO/DX INJ NEW DRUG ADDON: CPT

## 2023-04-27 PROCEDURE — C1713 ANCHOR/SCREW BN/BN,TIS/BN: HCPCS | Performed by: NEUROLOGICAL SURGERY

## 2023-04-27 PROCEDURE — 700101 HCHG RX REV CODE 250: Mod: UD | Performed by: NEUROLOGICAL SURGERY

## 2023-04-27 PROCEDURE — 700102 HCHG RX REV CODE 250 W/ 637 OVERRIDE(OP): Mod: UD | Performed by: ANESTHESIOLOGY

## 2023-04-27 PROCEDURE — 700101 HCHG RX REV CODE 250: Mod: UD | Performed by: ANESTHESIOLOGY

## 2023-04-27 PROCEDURE — 81025 URINE PREGNANCY TEST: CPT

## 2023-04-27 PROCEDURE — 110371 HCHG SHELL REV 272: Performed by: NEUROLOGICAL SURGERY

## 2023-04-27 PROCEDURE — 160041 HCHG SURGERY MINUTES - EA ADDL 1 MIN LEVEL 4: Performed by: NEUROLOGICAL SURGERY

## 2023-04-27 PROCEDURE — 700102 HCHG RX REV CODE 250 W/ 637 OVERRIDE(OP): Mod: UD | Performed by: NURSE PRACTITIONER

## 2023-04-27 PROCEDURE — 502000 HCHG MISC OR IMPLANTS RC 0278: Performed by: NEUROLOGICAL SURGERY

## 2023-04-27 PROCEDURE — 160048 HCHG OR STATISTICAL LEVEL 1-5: Performed by: NEUROLOGICAL SURGERY

## 2023-04-27 PROCEDURE — 160035 HCHG PACU - 1ST 60 MINS PHASE I: Performed by: NEUROLOGICAL SURGERY

## 2023-04-27 PROCEDURE — 96372 THER/PROPH/DIAG INJ SC/IM: CPT

## 2023-04-27 PROCEDURE — 110454 HCHG SHELL REV 250: Performed by: NEUROLOGICAL SURGERY

## 2023-04-27 PROCEDURE — 160029 HCHG SURGERY MINUTES - 1ST 30 MINS LEVEL 4: Performed by: NEUROLOGICAL SURGERY

## 2023-04-27 PROCEDURE — G0378 HOSPITAL OBSERVATION PER HR: HCPCS

## 2023-04-27 PROCEDURE — 82962 GLUCOSE BLOOD TEST: CPT

## 2023-04-27 PROCEDURE — A9270 NON-COVERED ITEM OR SERVICE: HCPCS | Mod: UD | Performed by: ANESTHESIOLOGY

## 2023-04-27 PROCEDURE — 700111 HCHG RX REV CODE 636 W/ 250 OVERRIDE (IP): Mod: UD | Performed by: NEUROLOGICAL SURGERY

## 2023-04-27 PROCEDURE — 700111 HCHG RX REV CODE 636 W/ 250 OVERRIDE (IP): Mod: UD | Performed by: ANESTHESIOLOGY

## 2023-04-27 PROCEDURE — 700105 HCHG RX REV CODE 258: Mod: UD | Performed by: NURSE PRACTITIONER

## 2023-04-27 PROCEDURE — 160036 HCHG PACU - EA ADDL 30 MINS PHASE I: Performed by: NEUROLOGICAL SURGERY

## 2023-04-27 PROCEDURE — 00630 ANES PX LUMBAR REGION NOS: CPT | Performed by: ANESTHESIOLOGY

## 2023-04-27 PROCEDURE — 700105 HCHG RX REV CODE 258: Mod: UD | Performed by: NEUROLOGICAL SURGERY

## 2023-04-27 PROCEDURE — 160002 HCHG RECOVERY MINUTES (STAT): Performed by: NEUROLOGICAL SURGERY

## 2023-04-27 PROCEDURE — 700101 HCHG RX REV CODE 250: Mod: UD | Performed by: NURSE PRACTITIONER

## 2023-04-27 DEVICE — CAGE CORNERSTONE PSR 14 X 11 X 7: Type: IMPLANTABLE DEVICE | Site: SPINE CERVICAL | Status: FUNCTIONAL

## 2023-04-27 DEVICE — IMPLANTABLE DEVICE: Type: IMPLANTABLE DEVICE | Site: SPINE CERVICAL | Status: FUNCTIONAL

## 2023-04-27 DEVICE — SCREW BONE ZEVO TITANIUM SELF DRILL 3.5MM X17MM (1EA): Type: IMPLANTABLE DEVICE | Site: SPINE CERVICAL | Status: FUNCTIONAL

## 2023-04-27 DEVICE — PUTTY GRAFTON 1CC: Type: IMPLANTABLE DEVICE | Site: SPINE CERVICAL | Status: FUNCTIONAL

## 2023-04-27 RX ORDER — AMOXICILLIN 250 MG
1 CAPSULE ORAL
Status: DISCONTINUED | OUTPATIENT
Start: 2023-04-27 | End: 2023-04-28 | Stop reason: HOSPADM

## 2023-04-27 RX ORDER — OXYCODONE HCL 5 MG/5 ML
5 SOLUTION, ORAL ORAL
Status: COMPLETED | OUTPATIENT
Start: 2023-04-27 | End: 2023-04-27

## 2023-04-27 RX ORDER — CEFAZOLIN SODIUM 1 G/3ML
INJECTION, POWDER, FOR SOLUTION INTRAMUSCULAR; INTRAVENOUS
Status: DISCONTINUED | OUTPATIENT
Start: 2023-04-27 | End: 2023-04-27 | Stop reason: HOSPADM

## 2023-04-27 RX ORDER — DOCUSATE SODIUM 100 MG/1
100 CAPSULE, LIQUID FILLED ORAL 2 TIMES DAILY
Status: DISCONTINUED | OUTPATIENT
Start: 2023-04-27 | End: 2023-04-28 | Stop reason: HOSPADM

## 2023-04-27 RX ORDER — HALOPERIDOL 5 MG/ML
1 INJECTION INTRAMUSCULAR
Status: DISCONTINUED | OUTPATIENT
Start: 2023-04-27 | End: 2023-04-27 | Stop reason: HOSPADM

## 2023-04-27 RX ORDER — ONDANSETRON 2 MG/ML
4 INJECTION INTRAMUSCULAR; INTRAVENOUS
Status: DISCONTINUED | OUTPATIENT
Start: 2023-04-27 | End: 2023-04-27 | Stop reason: HOSPADM

## 2023-04-27 RX ORDER — LISINOPRIL 20 MG/1
40 TABLET ORAL DAILY
Status: DISCONTINUED | OUTPATIENT
Start: 2023-04-28 | End: 2023-04-28 | Stop reason: HOSPADM

## 2023-04-27 RX ORDER — IPRATROPIUM BROMIDE AND ALBUTEROL SULFATE 2.5; .5 MG/3ML; MG/3ML
3 SOLUTION RESPIRATORY (INHALATION)
Status: DISCONTINUED | OUTPATIENT
Start: 2023-04-27 | End: 2023-04-27 | Stop reason: HOSPADM

## 2023-04-27 RX ORDER — SODIUM CHLORIDE, SODIUM LACTATE, POTASSIUM CHLORIDE, CALCIUM CHLORIDE 600; 310; 30; 20 MG/100ML; MG/100ML; MG/100ML; MG/100ML
INJECTION, SOLUTION INTRAVENOUS CONTINUOUS
Status: DISCONTINUED | OUTPATIENT
Start: 2023-04-27 | End: 2023-04-27 | Stop reason: HOSPADM

## 2023-04-27 RX ORDER — METHOCARBAMOL 750 MG/1
750 TABLET, FILM COATED ORAL EVERY 8 HOURS PRN
Status: DISCONTINUED | OUTPATIENT
Start: 2023-04-27 | End: 2023-04-28 | Stop reason: HOSPADM

## 2023-04-27 RX ORDER — HYDRALAZINE HYDROCHLORIDE 20 MG/ML
5 INJECTION INTRAMUSCULAR; INTRAVENOUS
Status: DISCONTINUED | OUTPATIENT
Start: 2023-04-27 | End: 2023-04-27 | Stop reason: HOSPADM

## 2023-04-27 RX ORDER — LIDOCAINE HYDROCHLORIDE 20 MG/ML
INJECTION, SOLUTION EPIDURAL; INFILTRATION; INTRACAUDAL; PERINEURAL PRN
Status: DISCONTINUED | OUTPATIENT
Start: 2023-04-27 | End: 2023-04-27 | Stop reason: SURG

## 2023-04-27 RX ORDER — ACETAMINOPHEN 500 MG
1000 TABLET ORAL EVERY 6 HOURS PRN
Status: ON HOLD | COMMUNITY
End: 2023-04-27

## 2023-04-27 RX ORDER — BUPROPION HYDROCHLORIDE 150 MG/1
150 TABLET, EXTENDED RELEASE ORAL 2 TIMES DAILY
Status: DISCONTINUED | OUTPATIENT
Start: 2023-04-27 | End: 2023-04-28 | Stop reason: HOSPADM

## 2023-04-27 RX ORDER — LABETALOL HYDROCHLORIDE 5 MG/ML
10 INJECTION, SOLUTION INTRAVENOUS
Status: DISCONTINUED | OUTPATIENT
Start: 2023-04-27 | End: 2023-04-28 | Stop reason: HOSPADM

## 2023-04-27 RX ORDER — CEFAZOLIN SODIUM 1 G/3ML
INJECTION, POWDER, FOR SOLUTION INTRAMUSCULAR; INTRAVENOUS PRN
Status: DISCONTINUED | OUTPATIENT
Start: 2023-04-27 | End: 2023-04-27 | Stop reason: SURG

## 2023-04-27 RX ORDER — HYDROMORPHONE HYDROCHLORIDE 2 MG/ML
INJECTION, SOLUTION INTRAMUSCULAR; INTRAVENOUS; SUBCUTANEOUS PRN
Status: DISCONTINUED | OUTPATIENT
Start: 2023-04-27 | End: 2023-04-27 | Stop reason: SURG

## 2023-04-27 RX ORDER — HYDROMORPHONE HYDROCHLORIDE 1 MG/ML
0.1 INJECTION, SOLUTION INTRAMUSCULAR; INTRAVENOUS; SUBCUTANEOUS
Status: DISCONTINUED | OUTPATIENT
Start: 2023-04-27 | End: 2023-04-27 | Stop reason: HOSPADM

## 2023-04-27 RX ORDER — DIPHENHYDRAMINE HYDROCHLORIDE 50 MG/ML
12.5 INJECTION INTRAMUSCULAR; INTRAVENOUS
Status: DISCONTINUED | OUTPATIENT
Start: 2023-04-27 | End: 2023-04-27 | Stop reason: HOSPADM

## 2023-04-27 RX ORDER — POLYETHYLENE GLYCOL 3350 17 G/17G
1 POWDER, FOR SOLUTION ORAL 2 TIMES DAILY PRN
Status: DISCONTINUED | OUTPATIENT
Start: 2023-04-27 | End: 2023-04-28 | Stop reason: HOSPADM

## 2023-04-27 RX ORDER — METOPROLOL TARTRATE 1 MG/ML
INJECTION, SOLUTION INTRAVENOUS PRN
Status: DISCONTINUED | OUTPATIENT
Start: 2023-04-27 | End: 2023-04-27 | Stop reason: SURG

## 2023-04-27 RX ORDER — GABAPENTIN 300 MG/1
600 CAPSULE ORAL ONCE
Status: COMPLETED | OUTPATIENT
Start: 2023-04-27 | End: 2023-04-27

## 2023-04-27 RX ORDER — OXYCODONE HYDROCHLORIDE AND ACETAMINOPHEN 5; 325 MG/1; MG/1
1-2 TABLET ORAL EVERY 4 HOURS PRN
Qty: 42 TABLET | Refills: 0
Start: 2023-04-27 | End: 2023-04-28

## 2023-04-27 RX ORDER — AMOXICILLIN 250 MG
1 CAPSULE ORAL NIGHTLY
Status: DISCONTINUED | OUTPATIENT
Start: 2023-04-27 | End: 2023-04-28 | Stop reason: HOSPADM

## 2023-04-27 RX ORDER — ENEMA 19; 7 G/133ML; G/133ML
1 ENEMA RECTAL
Status: DISCONTINUED | OUTPATIENT
Start: 2023-04-27 | End: 2023-04-28 | Stop reason: HOSPADM

## 2023-04-27 RX ORDER — MEPERIDINE HYDROCHLORIDE 25 MG/ML
12.5 INJECTION INTRAMUSCULAR; INTRAVENOUS; SUBCUTANEOUS
Status: DISCONTINUED | OUTPATIENT
Start: 2023-04-27 | End: 2023-04-27 | Stop reason: HOSPADM

## 2023-04-27 RX ORDER — LIDOCAINE HYDROCHLORIDE 40 MG/ML
SOLUTION TOPICAL PRN
Status: DISCONTINUED | OUTPATIENT
Start: 2023-04-27 | End: 2023-04-27 | Stop reason: SURG

## 2023-04-27 RX ORDER — DIPHENHYDRAMINE HYDROCHLORIDE 50 MG/ML
25 INJECTION INTRAMUSCULAR; INTRAVENOUS EVERY 6 HOURS PRN
Status: DISCONTINUED | OUTPATIENT
Start: 2023-04-27 | End: 2023-04-28 | Stop reason: HOSPADM

## 2023-04-27 RX ORDER — PROMETHAZINE HYDROCHLORIDE 25 MG/1
12.5-25 TABLET ORAL EVERY 4 HOURS PRN
Status: DISCONTINUED | OUTPATIENT
Start: 2023-04-27 | End: 2023-04-28 | Stop reason: HOSPADM

## 2023-04-27 RX ORDER — ONDANSETRON 2 MG/ML
INJECTION INTRAMUSCULAR; INTRAVENOUS PRN
Status: DISCONTINUED | OUTPATIENT
Start: 2023-04-27 | End: 2023-04-27

## 2023-04-27 RX ORDER — PROCHLORPERAZINE EDISYLATE 5 MG/ML
5-10 INJECTION INTRAMUSCULAR; INTRAVENOUS EVERY 4 HOURS PRN
Status: DISCONTINUED | OUTPATIENT
Start: 2023-04-27 | End: 2023-04-28 | Stop reason: HOSPADM

## 2023-04-27 RX ORDER — SEMAGLUTIDE 2.68 MG/ML
2 INJECTION, SOLUTION SUBCUTANEOUS
COMMUNITY
Start: 2023-03-30 | End: 2023-08-10

## 2023-04-27 RX ORDER — OXYCODONE HCL 10 MG/1
10 TABLET, FILM COATED, EXTENDED RELEASE ORAL ONCE
Status: COMPLETED | OUTPATIENT
Start: 2023-04-27 | End: 2023-04-27

## 2023-04-27 RX ORDER — DEXAMETHASONE SODIUM PHOSPHATE 4 MG/ML
INJECTION, SOLUTION INTRA-ARTICULAR; INTRALESIONAL; INTRAMUSCULAR; INTRAVENOUS; SOFT TISSUE PRN
Status: DISCONTINUED | OUTPATIENT
Start: 2023-04-27 | End: 2023-04-27 | Stop reason: SURG

## 2023-04-27 RX ORDER — AMLODIPINE BESYLATE 5 MG/1
5 TABLET ORAL DAILY
Status: DISCONTINUED | OUTPATIENT
Start: 2023-04-28 | End: 2023-04-28 | Stop reason: HOSPADM

## 2023-04-27 RX ORDER — ONDANSETRON 2 MG/ML
4 INJECTION INTRAMUSCULAR; INTRAVENOUS EVERY 4 HOURS PRN
Status: DISCONTINUED | OUTPATIENT
Start: 2023-04-27 | End: 2023-04-28 | Stop reason: HOSPADM

## 2023-04-27 RX ORDER — HYDROMORPHONE HYDROCHLORIDE 1 MG/ML
0.4 INJECTION, SOLUTION INTRAMUSCULAR; INTRAVENOUS; SUBCUTANEOUS
Status: DISCONTINUED | OUTPATIENT
Start: 2023-04-27 | End: 2023-04-27 | Stop reason: HOSPADM

## 2023-04-27 RX ORDER — METHOCARBAMOL 750 MG/1
750 TABLET, FILM COATED ORAL 3 TIMES DAILY PRN
COMMUNITY
End: 2023-08-10

## 2023-04-27 RX ORDER — BISACODYL 10 MG
10 SUPPOSITORY, RECTAL RECTAL
Status: DISCONTINUED | OUTPATIENT
Start: 2023-04-27 | End: 2023-04-28 | Stop reason: HOSPADM

## 2023-04-27 RX ORDER — SODIUM CHLORIDE AND POTASSIUM CHLORIDE 150; 900 MG/100ML; MG/100ML
INJECTION, SOLUTION INTRAVENOUS CONTINUOUS
Status: DISCONTINUED | OUTPATIENT
Start: 2023-04-27 | End: 2023-04-28 | Stop reason: HOSPADM

## 2023-04-27 RX ORDER — ROCURONIUM BROMIDE 10 MG/ML
INJECTION, SOLUTION INTRAVENOUS PRN
Status: DISCONTINUED | OUTPATIENT
Start: 2023-04-27 | End: 2023-04-27 | Stop reason: SURG

## 2023-04-27 RX ORDER — HYDROMORPHONE HYDROCHLORIDE 1 MG/ML
0.5 INJECTION, SOLUTION INTRAMUSCULAR; INTRAVENOUS; SUBCUTANEOUS
Status: DISCONTINUED | OUTPATIENT
Start: 2023-04-27 | End: 2023-04-28

## 2023-04-27 RX ORDER — HYDROMORPHONE HYDROCHLORIDE 1 MG/ML
0.2 INJECTION, SOLUTION INTRAMUSCULAR; INTRAVENOUS; SUBCUTANEOUS
Status: DISCONTINUED | OUTPATIENT
Start: 2023-04-27 | End: 2023-04-27 | Stop reason: HOSPADM

## 2023-04-27 RX ORDER — SODIUM CHLORIDE, SODIUM LACTATE, POTASSIUM CHLORIDE, CALCIUM CHLORIDE 600; 310; 30; 20 MG/100ML; MG/100ML; MG/100ML; MG/100ML
INJECTION, SOLUTION INTRAVENOUS CONTINUOUS
Status: ACTIVE | OUTPATIENT
Start: 2023-04-27 | End: 2023-04-27

## 2023-04-27 RX ORDER — ACETAMINOPHEN 500 MG
1000 TABLET ORAL ONCE
Status: COMPLETED | OUTPATIENT
Start: 2023-04-27 | End: 2023-04-27

## 2023-04-27 RX ORDER — DIPHENHYDRAMINE HCL 25 MG
25 TABLET ORAL EVERY 6 HOURS PRN
Status: DISCONTINUED | OUTPATIENT
Start: 2023-04-27 | End: 2023-04-28 | Stop reason: HOSPADM

## 2023-04-27 RX ORDER — HYDRALAZINE HYDROCHLORIDE 20 MG/ML
10 INJECTION INTRAMUSCULAR; INTRAVENOUS
Status: DISCONTINUED | OUTPATIENT
Start: 2023-04-27 | End: 2023-04-28 | Stop reason: HOSPADM

## 2023-04-27 RX ORDER — PROMETHAZINE HYDROCHLORIDE 25 MG/1
12.5-25 SUPPOSITORY RECTAL EVERY 4 HOURS PRN
Status: DISCONTINUED | OUTPATIENT
Start: 2023-04-27 | End: 2023-04-28 | Stop reason: HOSPADM

## 2023-04-27 RX ORDER — ACETAMINOPHEN 500 MG
1000 TABLET ORAL EVERY 8 HOURS
Status: DISCONTINUED | OUTPATIENT
Start: 2023-04-27 | End: 2023-04-28 | Stop reason: HOSPADM

## 2023-04-27 RX ORDER — INSULIN GLARGINE-YFGN 100 [IU]/ML
INJECTION, SOLUTION SUBCUTANEOUS
Status: ON HOLD | COMMUNITY
End: 2023-04-27

## 2023-04-27 RX ORDER — OXYCODONE HYDROCHLORIDE 10 MG/1
10 TABLET ORAL
Status: DISCONTINUED | OUTPATIENT
Start: 2023-04-27 | End: 2023-04-28

## 2023-04-27 RX ORDER — DEXMEDETOMIDINE HYDROCHLORIDE 100 UG/ML
INJECTION, SOLUTION INTRAVENOUS PRN
Status: DISCONTINUED | OUTPATIENT
Start: 2023-04-27 | End: 2023-04-27 | Stop reason: SURG

## 2023-04-27 RX ORDER — ONDANSETRON 4 MG/1
4 TABLET, ORALLY DISINTEGRATING ORAL EVERY 4 HOURS PRN
Status: DISCONTINUED | OUTPATIENT
Start: 2023-04-27 | End: 2023-04-28 | Stop reason: HOSPADM

## 2023-04-27 RX ORDER — MIDAZOLAM HYDROCHLORIDE 1 MG/ML
INJECTION INTRAMUSCULAR; INTRAVENOUS PRN
Status: DISCONTINUED | OUTPATIENT
Start: 2023-04-27 | End: 2023-04-27 | Stop reason: HOSPADM

## 2023-04-27 RX ORDER — OXYCODONE HYDROCHLORIDE 5 MG/1
5 TABLET ORAL
Status: DISCONTINUED | OUTPATIENT
Start: 2023-04-27 | End: 2023-04-28

## 2023-04-27 RX ORDER — METOPROLOL TARTRATE 1 MG/ML
1 INJECTION, SOLUTION INTRAVENOUS
Status: DISCONTINUED | OUTPATIENT
Start: 2023-04-27 | End: 2023-04-27 | Stop reason: HOSPADM

## 2023-04-27 RX ORDER — OXYCODONE HCL 5 MG/5 ML
10 SOLUTION, ORAL ORAL
Status: COMPLETED | OUTPATIENT
Start: 2023-04-27 | End: 2023-04-27

## 2023-04-27 RX ADMIN — LIDOCAINE HYDROCHLORIDE 100 MG: 20 INJECTION, SOLUTION EPIDURAL; INFILTRATION; INTRACAUDAL at 11:15

## 2023-04-27 RX ADMIN — SODIUM CHLORIDE, POTASSIUM CHLORIDE, SODIUM LACTATE AND CALCIUM CHLORIDE 1000 ML: 600; 310; 30; 20 INJECTION, SOLUTION INTRAVENOUS at 10:45

## 2023-04-27 RX ADMIN — OXYCODONE HYDROCHLORIDE 10 MG: 10 TABLET, FILM COATED, EXTENDED RELEASE ORAL at 10:46

## 2023-04-27 RX ADMIN — LIDOCAINE HYDROCHLORIDE 4 ML: 40 SOLUTION TOPICAL at 11:16

## 2023-04-27 RX ADMIN — HYDROMORPHONE HYDROCHLORIDE 1 MG: 2 INJECTION INTRAMUSCULAR; INTRAVENOUS; SUBCUTANEOUS at 11:42

## 2023-04-27 RX ADMIN — HYDROMORPHONE HYDROCHLORIDE 1 MG: 2 INJECTION INTRAMUSCULAR; INTRAVENOUS; SUBCUTANEOUS at 11:10

## 2023-04-27 RX ADMIN — DEXMEDETOMIDINE 20 MCG: 200 INJECTION, SOLUTION INTRAVENOUS at 12:07

## 2023-04-27 RX ADMIN — CEFAZOLIN 2 G: 2 INJECTION, POWDER, FOR SOLUTION INTRAMUSCULAR; INTRAVENOUS at 20:09

## 2023-04-27 RX ADMIN — OXYCODONE HYDROCHLORIDE 10 MG: 5 SOLUTION ORAL at 13:29

## 2023-04-27 RX ADMIN — INSULIN GLARGINE-YFGN 8 UNITS: 100 INJECTION, SOLUTION SUBCUTANEOUS at 20:19

## 2023-04-27 RX ADMIN — BUPROPION HYDROCHLORIDE 150 MG: 150 TABLET, EXTENDED RELEASE ORAL at 17:06

## 2023-04-27 RX ADMIN — GABAPENTIN 600 MG: 300 CAPSULE ORAL at 10:46

## 2023-04-27 RX ADMIN — DEXMEDETOMIDINE 20 MCG: 200 INJECTION, SOLUTION INTRAVENOUS at 11:48

## 2023-04-27 RX ADMIN — HYDROMORPHONE HYDROCHLORIDE 0.2 MG: 1 INJECTION, SOLUTION INTRAMUSCULAR; INTRAVENOUS; SUBCUTANEOUS at 13:49

## 2023-04-27 RX ADMIN — OXYCODONE 5 MG: 5 TABLET ORAL at 20:04

## 2023-04-27 RX ADMIN — SODIUM CHLORIDE, POTASSIUM CHLORIDE, SODIUM LACTATE AND CALCIUM CHLORIDE: 600; 310; 30; 20 INJECTION, SOLUTION INTRAVENOUS at 10:47

## 2023-04-27 RX ADMIN — SUGAMMADEX 200 MG: 100 INJECTION, SOLUTION INTRAVENOUS at 13:03

## 2023-04-27 RX ADMIN — CEFAZOLIN 3 G: 330 INJECTION, POWDER, FOR SOLUTION INTRAMUSCULAR; INTRAVENOUS at 11:20

## 2023-04-27 RX ADMIN — ROCURONIUM BROMIDE 50 MG: 10 INJECTION, SOLUTION INTRAVENOUS at 11:15

## 2023-04-27 RX ADMIN — MIDAZOLAM HYDROCHLORIDE 2 MG: 1 INJECTION, SOLUTION INTRAMUSCULAR; INTRAVENOUS at 11:07

## 2023-04-27 RX ADMIN — ROCURONIUM BROMIDE 50 MG: 10 INJECTION, SOLUTION INTRAVENOUS at 12:04

## 2023-04-27 RX ADMIN — ONDANSETRON 4 MG: 2 INJECTION INTRAMUSCULAR; INTRAVENOUS at 15:32

## 2023-04-27 RX ADMIN — HYDRALAZINE HYDROCHLORIDE 5 MG: 20 INJECTION INTRAMUSCULAR; INTRAVENOUS at 13:40

## 2023-04-27 RX ADMIN — METOPROLOL TARTRATE 5 MG: 5 INJECTION, SOLUTION INTRAVENOUS at 13:08

## 2023-04-27 RX ADMIN — METHOCARBAMOL 750 MG: 750 TABLET ORAL at 17:06

## 2023-04-27 RX ADMIN — POTASSIUM CHLORIDE AND SODIUM CHLORIDE: 900; 150 INJECTION, SOLUTION INTRAVENOUS at 15:35

## 2023-04-27 RX ADMIN — HYDROMORPHONE HYDROCHLORIDE 0.4 MG: 1 INJECTION, SOLUTION INTRAMUSCULAR; INTRAVENOUS; SUBCUTANEOUS at 13:37

## 2023-04-27 RX ADMIN — DEXAMETHASONE SODIUM PHOSPHATE 8 MG: 4 INJECTION, SOLUTION INTRA-ARTICULAR; INTRALESIONAL; INTRAMUSCULAR; INTRAVENOUS; SOFT TISSUE at 11:15

## 2023-04-27 RX ADMIN — PROPOFOL 50 MCG/KG/MIN: 10 INJECTION, EMULSION INTRAVENOUS at 11:23

## 2023-04-27 RX ADMIN — OXYCODONE 5 MG: 5 TABLET ORAL at 15:42

## 2023-04-27 RX ADMIN — DIPHENHYDRAMINE HYDROCHLORIDE 12.5 MG: 50 INJECTION INTRAMUSCULAR; INTRAVENOUS at 14:03

## 2023-04-27 RX ADMIN — ONDANSETRON 4 MG: 2 INJECTION INTRAMUSCULAR; INTRAVENOUS at 13:03

## 2023-04-27 RX ADMIN — PROCHLORPERAZINE EDISYLATE 10 MG: 5 INJECTION INTRAMUSCULAR; INTRAVENOUS at 17:39

## 2023-04-27 RX ADMIN — ACETAMINOPHEN 1000 MG: 500 TABLET, FILM COATED ORAL at 10:46

## 2023-04-27 RX ADMIN — HYDROMORPHONE HYDROCHLORIDE 0.4 MG: 1 INJECTION, SOLUTION INTRAMUSCULAR; INTRAVENOUS; SUBCUTANEOUS at 13:31

## 2023-04-27 ASSESSMENT — COGNITIVE AND FUNCTIONAL STATUS - GENERAL
TURNING FROM BACK TO SIDE WHILE IN FLAT BAD: A LITTLE
PERSONAL GROOMING: A LITTLE
MOVING FROM LYING ON BACK TO SITTING ON SIDE OF FLAT BED: A LITTLE
CLIMB 3 TO 5 STEPS WITH RAILING: A LITTLE
MOBILITY SCORE: 18
DAILY ACTIVITIY SCORE: 18
HELP NEEDED FOR BATHING: A LITTLE
EATING MEALS: A LITTLE
SUGGESTED CMS G CODE MODIFIER DAILY ACTIVITY: CK
SUGGESTED CMS G CODE MODIFIER MOBILITY: CK
DRESSING REGULAR LOWER BODY CLOTHING: A LITTLE
MOVING TO AND FROM BED TO CHAIR: A LITTLE
DRESSING REGULAR UPPER BODY CLOTHING: A LITTLE
STANDING UP FROM CHAIR USING ARMS: A LITTLE
WALKING IN HOSPITAL ROOM: A LITTLE
TOILETING: A LITTLE

## 2023-04-27 ASSESSMENT — LIFESTYLE VARIABLES
TOTAL SCORE: 0
TOTAL SCORE: 0
AVERAGE NUMBER OF DAYS PER WEEK YOU HAVE A DRINK CONTAINING ALCOHOL: 0
ALCOHOL_USE: NO
HOW MANY TIMES IN THE PAST YEAR HAVE YOU HAD 5 OR MORE DRINKS IN A DAY: 0
HAVE YOU EVER FELT YOU SHOULD CUT DOWN ON YOUR DRINKING: NO
EVER FELT BAD OR GUILTY ABOUT YOUR DRINKING: NO
TOTAL SCORE: 0
ON A TYPICAL DAY WHEN YOU DRINK ALCOHOL HOW MANY DRINKS DO YOU HAVE: 0
HAVE PEOPLE ANNOYED YOU BY CRITICIZING YOUR DRINKING: NO
CONSUMPTION TOTAL: NEGATIVE
EVER HAD A DRINK FIRST THING IN THE MORNING TO STEADY YOUR NERVES TO GET RID OF A HANGOVER: NO

## 2023-04-27 ASSESSMENT — PATIENT HEALTH QUESTIONNAIRE - PHQ9
2. FEELING DOWN, DEPRESSED, IRRITABLE, OR HOPELESS: NOT AT ALL
SUM OF ALL RESPONSES TO PHQ9 QUESTIONS 1 AND 2: 0
1. LITTLE INTEREST OR PLEASURE IN DOING THINGS: NOT AT ALL

## 2023-04-27 ASSESSMENT — PAIN DESCRIPTION - PAIN TYPE
TYPE: ACUTE PAIN;SURGICAL PAIN
TYPE: ACUTE PAIN
TYPE: ACUTE PAIN;SURGICAL PAIN
TYPE: ACUTE PAIN
TYPE: ACUTE PAIN;SURGICAL PAIN

## 2023-04-27 ASSESSMENT — PAIN SCALES - GENERAL: PAIN_LEVEL: 5

## 2023-04-27 ASSESSMENT — FIBROSIS 4 INDEX
FIB4 SCORE: 0.81
FIB4 SCORE: 0.81

## 2023-04-27 NOTE — OR NURSING
Assumed care in pre op 42 year old female sor surgery with Dr. Shelton. Patient is alert , oriented x 4 walks with a steady gait complaining of numbness and tingling sensation on both lower legs and numbness on all extremities. NPO , allergies and surgical procedure , consent signed by the patient. SO [place] and bedside and patient consented to talk about her medical issues with his presence. Iv established right FA g20 and blood sugar checked 102mg/dl. LR 1 liter at 10ml started and regulated as ordered. Waiting for MD's.

## 2023-04-27 NOTE — ANESTHESIA PROCEDURE NOTES
Airway    Date/Time: 4/27/2023 11:16 AM  Performed by: Yobani Brown M.D.  Authorized by: Yobani Brown M.D.     Location:  OR  Urgency:  Elective  Indications for Airway Management:  Anesthesia      Spontaneous Ventilation: absent    Sedation Level:  Deep  Preoxygenated: Yes    Patient Position:  Sniffing  Mask Difficulty Assessment:  0 - not attempted  Final Airway Type:  Endotracheal airway  Final Endotracheal Airway:  ETT  Cuffed: Yes    Technique Used for Successful ETT Placement:  Direct laryngoscopy    Insertion Site:  Oral  Blade Type:  Adelina  Laryngoscope Blade/Videolaryngoscope Blade Size:  3  ETT Size (mm):  7.0  Measured from:  Teeth  ETT to Teeth (cm):  20  Placement Verified by: auscultation and capnometry    Cormack-Lehane Classification:  Grade I - full view of glottis  Number of Attempts at Approach:  1   No neck extension

## 2023-04-27 NOTE — DISCHARGE SUMMARY
Discharge Summary    CHIEF COMPLAINT ON ADMISSION  No chief complaint on file.      Reason for Admission  SPINAL STENOSIS IN CERVICAL REGION*     Admission Date  4/27/2023    CODE STATUS  No Order    HPI & HOSPITAL COURSE  The patient is a 42-year-old female who was admitted to the hospital on 4/27/2023 and underwent C5-6 ACDF with Dr. Shelton.  She tolerated the procedure well.  She will be discharged home once cleared by PT/OT, eating and drinking okay and pain tolerated with oral medications.  Therefore, she is discharged in good and stable condition to home with close outpatient follow-up.    The patient recovered much more quickly than anticipated on admission.    Discharge Date  4/28/2023 if meeting criteria    FOLLOW UP ITEMS POST DISCHARGE  With SNG in 2 weeks    DISCHARGE DIAGNOSES  Active Problems:    * No active hospital problems. *  Resolved Problems:    * No resolved hospital problems. *      FOLLOW UP  No future appointments.  No follow-up provider specified.    MEDICATIONS ON DISCHARGE     Medication List        START taking these medications        Instructions   oxyCODONE-acetaminophen 5-325 MG Tabs  Commonly known as: PERCOCET   Take 1-2 Tablets by mouth every four hours as needed for Moderate Pain or Severe Pain for up to 7 days.  Dose: 1-2 Tablet            CONTINUE taking these medications        Instructions   amLODIPine 5 MG Tabs  Commonly known as: NORVASC   Take 1 Tablet by mouth every day.  Dose: 5 mg     buPROPion  MG Tb12 sustained-release tablet  Commonly known as: WELLBUTRIN-SR   Take 150 mg by mouth 2 times a day.  Dose: 150 mg     Insulin Glargine-yfgn 100 UNIT/ML Sopn   Inject 8 Units under the skin at bedtime as needed.  Dose: 8 Units     lisinopril 40 MG tablet  Commonly known as: PRINIVIL   Take 40 mg by mouth every day.  Dose: 40 mg     methocarbamol 750 MG Tabs  Commonly known as: ROBAXIN   Take 750 mg by mouth 3 times a day as needed.  Dose: 750 mg     Ozempic (2 MG/DOSE) 8  "MG/3ML Sopn  Generic drug: Semaglutide (2 MG/DOSE)   Inject 2 mg under the skin every 7 days.  Dose: 2 mg            STOP taking these medications      acetaminophen 500 MG Tabs  Commonly known as: TYLENOL              Allergies  Allergies   Allergen Reactions    Carbamazepine Rash and Itching    Morphine Vomiting    Tape Rash     Per patient, \"sensitive to tape and bandaids. Result sin contact dermatitis\"       DIET  No orders of the defined types were placed in this encounter.      ACTIVITY  As tolerated.  10-15-lb lifting restriction    CONSULTATIONS  NA    PROCEDURES  C5-6 ACDF    LABORATORY  Lab Results   Component Value Date    SODIUM 141 04/24/2023    POTASSIUM 4.1 04/24/2023    CHLORIDE 105 04/24/2023    CO2 26 04/24/2023    GLUCOSE 96 04/24/2023    BUN 8 04/24/2023    CREATININE 0.57 04/24/2023    CREATININE 0.8 10/04/2008        Lab Results   Component Value Date    WBC 8.4 04/24/2023    HEMOGLOBIN 13.9 04/24/2023    HEMATOCRIT 42.5 04/24/2023    PLATELETCT 266 04/24/2023        Total time of the discharge process exceeds 30 minutes.  "

## 2023-04-27 NOTE — ANESTHESIA PREPROCEDURE EVALUATION
Case: [id number] Date/Time: 04/27/23 1045    Procedure: ARTIFICIAL DISC C5-6    Pre-op diagnosis: SPINAL STENOSIS IN CERVICAL REGION WITH MYELOPATHY    Location: [facility] 05 / SURGERY [facility]    Surgeons: Jonatan Shelton M.D.        Past Medical History:   Diagnosis Date   • ADHD (attention deficit hyperactivity disorder)    • Dental disorder 2/2/2007    Full dentures   • Diabetes ([facility]) 6/1/2012   • Heart burn 2004   • Hypertension    • Indigestion 2004   • Migraine    • Pneumonia 12/20/2022   • PONV (postoperative nausea and vomiting) 06/12/1994   • Psychiatric disorder Bipolar   • Seizure ([facility]) 2021    I have never been diagnosed but have had seizures after asphyxiation   • Snoring 2012   • Urinary incontinence 1/1/2013    Stress incontinence, still need to go to urology     Past Surgical History:   Procedure Laterality Date   • TUBAL COAGULATION LAPAROSCOPIC BILATERAL  09/02/2011    Performed by KEMI GREGG at SURGERY SAME DAY [facility] ORS   • SOFIA BY LAPAROSCOPY  10/04/2008    Performed by WERO PIMENTEL at SURGERY [facility] ORS   • OTHER  4/1/1999    Tonsillectomy   • OTHER ABDOMINAL SURGERY  10/20/2008    Galbladder removal   • SD REMOVAL OF TONSILS,<13 Y/O       Current Outpatient Medications   Medication Instructions   • amLODIPine (NORVASC) 5 mg, Oral, DAILY   • buPROPion SR (WELLBUTRIN-SR) 150 MG TABLET SR 12 HR sustained-release tablet bupropion HCl  mg tablet,12 hr sustained-release   TAKE 1 TABLET BY MOUTH TWICE DAILY   • Insulin Glargine-yfgn 100 UNIT/ML Solution Pen-injector INJECT 8 UNITS SUBCUTANEOUSLY ONCE A DAY   • lisinopril (PRINIVIL) 40 mg, Oral, DAILY     Lab Results   Component Value Date/Time    SODIUM 141 04/24/2023 01:31 PM    POTASSIUM 4.1 04/24/2023 01:31 PM    CHLORIDE 105 04/24/2023 01:31 PM    CO2 26 04/24/2023 01:31 PM    GLUCOSE 96 04/24/2023 01:31 PM    BUN 8 04/24/2023 01:31 PM    CREATININE 0.57 04/24/2023 01:31 PM    CREATININE 0.8 10/04/2008 09:00 AM       Lab Results   Component Value Date/Time    WBC 8.4 04/24/2023 01:31 PM    RBC 5.28 04/24/2023 01:31 PM    HEMOGLOBIN 13.9 04/24/2023 01:31 PM    HEMATOCRIT 42.5 04/24/2023 01:31 PM    MCV 80.5 (L) 04/24/2023 01:31 PM    MCH 26.3 (L) 04/24/2023 01:31 PM    MCHC 32.7 (L) 04/24/2023 01:31 PM    MPV 10.5 04/24/2023 01:31 PM    NEUTSPOLYS 52.80 04/24/2023 01:31 PM    LYMPHOCYTES 38.10 04/24/2023 01:31 PM    MONOCYTES 5.70 04/24/2023 01:31 PM    EOSINOPHILS 2.70 04/24/2023 01:31 PM    BASOPHILS 0.50 04/24/2023 01:31 PM    HYPOCHROMIA 1+ 11/17/2011 06:15 PM        Physical Exam    Airway   Mallampati: II  TM distance: >3 FB  Neck ROM: full       Cardiovascular - normal exam  Rhythm: regular  Rate: normal  (-) murmur     Dental   (+) upper dentures, lower dentures           Pulmonary - normal exam  Breath sounds clear to auscultation     Abdominal   (+) obese     Neurological - normal exam               Anesthesia Plan    ASA 3   ASA physical status 3 criteria: morbid obesity - BMI greater than or equal to 40    Plan - general       Airway plan will be ETT          Induction: intravenous    Postoperative Plan: Postoperative administration of opioids is intended.    Pertinent diagnostic labs and testing reviewed    Informed Consent:    Anesthetic plan and risks discussed with patient.    Use of blood products discussed with: patient whom consented to blood products.

## 2023-04-27 NOTE — ANESTHESIA TIME REPORT
Anesthesia Start and Stop Event Times     Date Time Event    4/27/2023 1051 Ready for Procedure     1104 Anesthesia Start     1321 Anesthesia Stop        Responsible Staff  04/27/23    Name Role Begin End    Yobani Brown M.D. Anesth 1104 1321        Overtime Reason:  no overtime (within assigned shift)    Comments:

## 2023-04-27 NOTE — DISCHARGE INSTRUCTIONS
HOME CARE INSTRUCTIONS    ACTIVITY: Rest and take it easy for the first 24 hours.  A responsible adult is recommended to remain with you during that time.  It is normal to feel sleepy.  We encourage you to not do anything that requires balance, judgment or coordination.    FOR 24 HOURS DO NOT:  Drive, operate machinery or run household appliances.  Drink beer or alcoholic beverages.  Make important decisions or sign legal documents.    SPECIAL INSTRUCTIONS:   Ok to shower 24hrs after drain removal, pat incision dry, leave open to air- no dressing   No Aspirin or non-steroidal anti-inflammatory medications (aleve, motrin, ibuprofen, celebrex) or over the counter supplements until cleared by our office.  No driving while wearing the collar  Over the counter stool softeners daily while on narcotics  No lifting greater than 15 pounds, no repetitive motion above shoulder level  Follow up at [facility] 2 weeks after surgery     DIET: To avoid nausea, slowly advance diet as tolerated, avoiding spicy or greasy foods for the first day.  Add more substantial food to your diet according to your physician's instructions.  Babies can be fed formula or breast milk as soon as they are hungry.  INCREASE FLUIDS AND FIBER TO AVOID CONSTIPATION.    SURGICAL DRESSING/BATHING:     MEDICATIONS: Resume taking daily medication.  Take prescribed pain medication with food.  If no medication is prescribed, you may take non-aspirin pain medication if needed.  PAIN MEDICATION CAN BE VERY CONSTIPATING.  Take a stool softener or laxative such as senokot, pericolace, or milk of magnesia if needed.    Prescription given for percocet, and robaxin.  Last pain medication given 1000mg of Tylenol at 10:16pm and 10 mg of Roxicodone at 1:29pm.    A follow-up appointment should be arranged with your doctor in 1-2 weeks; call to schedule.    You should CALL YOUR PHYSICIAN if you develop:  Fever greater than 101 degrees F.  Pain not relieved by  medication, or persistent nausea or vomiting.  Excessive bleeding (blood soaking through dressing) or unexpected drainage from the wound.  Extreme redness or swelling around the incision site, drainage of pus or foul smelling drainage.  Inability to urinate or empty your bladder within 8 hours.  Problems with breathing or chest pain.    You should call 911 if you develop problems with breathing or chest pain.  If you are unable to contact your doctor or surgical center, you should go to the nearest emergency room or urgent care center.  Physician's telephone #: [phone]    MILD FLU-LIKE SYMPTOMS ARE NORMAL.  YOU MAY EXPERIENCE GENERALIZED MUSCLE ACHES, THROAT IRRITATION, HEADACHE AND/OR SOME NAUSEA.    If any questions arise, call your doctor.  If your doctor is not available, please feel free to call the Surgical Center at [phone].  The Center is open Monday through Friday from 7AM to 7PM.      A registered nurse may call you a few days after your surgery to see how you are doing after your procedure.    You may also receive a survey in the mail within the next two weeks and we ask that you take a few moments to complete the survey and return it to us.  Our goal is to provide you with very good care and we value your comments.     Depression / Suicide Risk    As you are discharged from this [facility] facility, it is important to learn how to keep safe from harming yourself.    Recognize the warning signs:  Abrupt changes in personality, positive or negative- including increase in energy   Giving away possessions  Change in eating patterns- significant weight changes-  positive or negative  Change in sleeping patterns- unable to sleep or sleeping all the time   Unwillingness or inability to communicate  Depression  Unusual sadness, discouragement and loneliness  Talk of wanting to die  Neglect of personal appearance   Rebelliousness- reckless behavior  Withdrawal from people/activities they  love  Confusion- inability to concentrate     If you or a loved one observes any of these behaviors or has concerns about self-harm, here's what you can do:  Talk about it- your feelings and reasons for harming yourself  Remove any means that you might use to hurt yourself (examples: pills, rope, extension cords, firearm)  Get professional help from the community (Mental Health, Substance Abuse, psychological counseling)  Do not be alone:Call your Safe Contact- someone whom you trust who will be there for you.  Call your local CRISIS HOTLINE 662-4871 or 921-770-0531  Call your local Children's Mobile Crisis Response Team [place] (343) 034-0959 or www.[employer]  Call the toll free National Suicide Prevention Hotlines   National Suicide Prevention Lifeline 943-942-RADX (8271)  National Hope Line Network 800-SUICIDE (087-8689)    I acknowledge receipt and understanding of these Home Care instructions.

## 2023-04-27 NOTE — OP REPORT
Surgeon Jonatan Shelton  First assist Mitesh Thakur    Preoperative diagnosis C5-6 herniated disc with cord compression and cervical myelopathy  Postoperative diagnosis same    Procedure  C5-6 anterior cervical discectomy  C5-6 bilateral foraminotomies  C5-6 placement of anterior interbody cage  C5-6 anterior instrumentation using anterior plating system and screws placed bilaterally at C5, C6  C5-6 anterior interbody arthrodesis using DBM putty placed in the interbody cage  Modifier 22 this patient has a BMI of 56 making it extremely challenging for both opening closure and exposure as well as imaging appropriately for her neck.  She was able to be visualized using a C arm but the O-arm was available.  Given that finding it took at least 50% longer to do the procedure both opening closure and exposing appropriately.    Findings  During the surgery the patient initial encounter with her anterior disc osteophyte complex was found to have a complete large disc osteophyte that was fused this required us to remove it using a Leksell rongeur and then during the process of placing the [place] pins in an expanded the disc interspace as we would normally do as part of a discectomy the patient had very little to no movement at the level showing that she likely had circumferential osteophyte complex going around the annulus on the lateral and posterior aspects of the vertebra once we had removed all of the disc as well as the posterior disc osteophyte complex it was very apparent there was not enough mobility of the disc interspace for us to move forward with a disc arthroplasty and converted to a disc arthrodesis.  Given this finding we feel that it was appropriate at this time to try to distract the patient to get her in direct decompression as well as fused the patient so that she would not have increased interscapular pain due to trying to fracture open the nonmobile disc.    Complications none  Implant was a Besstech 7 mm peek  interbody cage  15 mm skyline plate  And to 17 mm self-tapping self drilling screws placed bilaterally at C5, C6  1 cc graft on putty packed into the cage    Drain medium Hemovac    Brief HPI please see preop note  Consent was obtained for the patient and her spouse improvement present risk complication occasion surgery which included but not limited to need for more surgery.    Procedure in detail  Patient is brought in the operating system general anesthesia with endotracheal intubation once this was completed we used the bed rails to talk her arms towards her sides in the posterior wrist together using arm pulse the bed extenders and bed rails were necessary due to the patient's size.  Once she was secured appropriately and padded appropriately so that her elbows were not getting impinged on by the bed rails we then taped her shoulders down to the inferior aspect of the bed is as much as possible without distracting on the patient's brachial plexus and then clipped prepped and draped in sterile usual fashion for a anterior cervical discectomy approach eccentric to the right side.  Surgical timeout was then performed confirm the correct site site preservation with all faculty and staff agreeing we then began our surgery.    We started with a 10 blade through scalpel and the anterior crease of been marked prior to draping once that had been opened down to the dermis we then used Bovie cautery Bovie down to the platysma that a supraplatysmal dissection with Bovie self-retaining retention we then retractors then placed in the wound and then we cut through the platysma to the infra platysmal dissection using the Bovie cautery again replacing retractor underneath the platysma we then exposed the sternocleidomastoid and then rotated this away laterally once we got down to the strap muscles we then placed another retractor into the wound pushing the sternocleidomastoid over and then dissecting under the omohyoid we  bisected it intact it we then used the plane between or underneath the omohyoid down to the prevertebral fascia making sure that the carotid and the jug jugular sheath was displaced laterally once we reached the bottom we then used the Cloward and then placed a snap on the most bulging disc in the putamen a AP and lateral fluoroscopy shot to confirm our location this was at C5-6 once we have confirmed this we then Bovie down to the disc and noted that there was a sheet of bone over this disc at this level we therefore took a Leksell rongeur bit away that disc and 1 bite and was able to move the anterior osteophyte complex we then placed a self-retaining ring with stick retractor system into the wound and then retracted the paravertebral muscles once that was in place we were then able to place or dissect away the AL L off the inferior half of C5 superior half of C6 then placed [place] pins distract the Kingsbury pins and then began our discectomy using Bovie cautery the drill curettes biting away the anterior lip of the C5 vertebra allowing us better access during this process we noted that there was very little mobility between the C5-6 disc interspace despite trying to retract with [place] pins we then placed a vertebral interbody distraction device into the intradiscal space and then expanded it allowing us to open the slightly there is very minimal motion approximately 1 mm was of space was gained making sure that we did not cause any undue distraction or rapid movements that we could cause a vascular injury given that finding we felt that there was not appropriate to place a disc replacement instead we move towards a discectomy we did so by first drilling off the posterior disc osteophyte complex as well as a lateral disc osteophyte complex in the posterior margin of the disc interspace once we are able to scoop out all of the disc we then under able to undermine the PLL and then cut it out using a scissor motion  using a 2 cut punch Kerrison removing all the posterior disc osteophyte complex behind the C5 and C6 vertebra until a large nerve hook could be passed underneath the vertebra we then did a bilateral foraminotomies using again a 2 punch Kerrison until we could pass the large nerve hook out the foramen at C5-6 bilaterally completing her discectomy at this point there was not enough motion of the segment to move forward with the again the disc arthroplasty we converted to a and arthrodesis we did so by sizing a 7 mm implant which was slightly larger than the disc interspace to allow us for some indirect distraction across the the foraminal spaces and then we packed that with DBM putty and then placed it in the disc interspace completing our interbody as well as our arthrodesis at this point we then remove the [place] pins and then placed an anterior plating system onto the disc interspace and then used a drill to make  holes at C5 and C6 respectively and then placed 17 mm screws into the plate secured in place with the locking mechanism then obtained AP lateral fluoroscopy shots confirm hardware placement at the C5-6 level.    At this point we had completed the procedure we inspected for hemostasis placed a medium Hemovac along the patient's hardware tunneled out superiorly and then reapproximated the omohyoid followed by the platysma followed by the dermis with all with 3-0 Vicryl's and then a Monocryl subcu and Dermabond was applied as a dressing.    All counts were correct x2  My nurse practitioner was necessary she assisted with retraction all of the viscera as well as the surgery explaining for the patient make it safer  This is a modifier 22 due to the patient's BMI of 56.8 making this much more challenging procedure given the habitus.    I was present for the entire the case  Jonatan Shelton MD

## 2023-04-27 NOTE — CARE PLAN
Problem: Pain - Standard  Goal: Alleviation of pain or a reduction in pain to the patient’s comfort goal  Outcome: Progressing  Note: Prn meds per MAR``     Problem: Knowledge Deficit - Standard  Goal: Patient and family/care givers will demonstrate understanding of plan of care, disease process/condition, diagnostic tests and medications  Outcome: Progressing  Note: Pain control, spine precaution, monitor hvac output   The patient is Watcher - Medium risk of patient condition declining or worsening    Shift Goals  Clinical Goals: pain control, stable vitals igns  Patient Goals: pain and sleeping  Family Goals: comfort    Progress made toward(s) clinical / shift goals:  stable post op vital signs, pain contriol    Patient is not progressing towards the following goals:

## 2023-04-27 NOTE — PROGRESS NOTES
Received from PACU via gurney, able to ambulate to BR and void, aaox4, denies NT, aspen collar on, ant neck incision with dermabond CDI, hvac sutured to ant neck with no dressing noted cdi, hvac compressed with scant output, c/o vomiting 100ml Clear vomitus, CL diet ordered, POc discussed, needs attended, spine concept reinforcement, encouraged use of IS.

## 2023-04-27 NOTE — ANESTHESIA POSTPROCEDURE EVALUATION
Patient: Martine Godinez    Procedure Summary     Date: 04/27/23 Room / Location: [facility] 05 / SURGERY [facility]    Anesthesia Start: 1104 Anesthesia Stop: 1321    Procedure: DISCECTOMY, SPINE, CERVICAL, ANTERIOR APPROACH, WITH FUSION C5-6 (Neck) Diagnosis: (SPINAL STENOSIS IN CERVICAL REGION WITH MYELOPATHY)    Surgeons: Jonatan Shelton M.D. Responsible Provider: Yobani Brown M.D.    Anesthesia Type: general ASA Status: 3          Final Anesthesia Type: general  Last vitals  BP   Blood Pressure: (!) 163/87    Temp   36.6 °C (97.9 °F)    Pulse   87   Resp   15    SpO2   94 %      Anesthesia Post Evaluation    Patient location during evaluation: PACU  Patient participation: complete - patient participated  Level of consciousness: sleepy but conscious  Pain score: 5    Airway patency: patent  Anesthetic complications: no  Cardiovascular status: hemodynamically stable  Respiratory status: acceptable  Hydration status: balanced    PONV: none          There were no known notable events for this encounter.     Nurse Pain Score: 5 (NPRS)

## 2023-04-28 ENCOUNTER — PHARMACY VISIT (OUTPATIENT)
Dept: PHARMACY | Facility: MEDICAL CENTER | Age: 43
End: 2023-04-28
Payer: COMMERCIAL

## 2023-04-28 VITALS
DIASTOLIC BLOOD PRESSURE: 89 MMHG | TEMPERATURE: 98.4 F | SYSTOLIC BLOOD PRESSURE: 148 MMHG | WEIGHT: 293 LBS | BODY MASS INDEX: 50.02 KG/M2 | HEART RATE: 95 BPM | HEIGHT: 64 IN | OXYGEN SATURATION: 94 % | RESPIRATION RATE: 16 BRPM

## 2023-04-28 PROBLEM — G72.9 CERVICAL MYOPATHY: Status: ACTIVE | Noted: 2023-04-28

## 2023-04-28 PROCEDURE — 700102 HCHG RX REV CODE 250 W/ 637 OVERRIDE(OP): Mod: UD | Performed by: NURSE PRACTITIONER

## 2023-04-28 PROCEDURE — 97162 PT EVAL MOD COMPLEX 30 MIN: CPT

## 2023-04-28 PROCEDURE — 97165 OT EVAL LOW COMPLEX 30 MIN: CPT

## 2023-04-28 PROCEDURE — G0378 HOSPITAL OBSERVATION PER HR: HCPCS

## 2023-04-28 PROCEDURE — 700105 HCHG RX REV CODE 258: Mod: UD | Performed by: NURSE PRACTITIONER

## 2023-04-28 PROCEDURE — A9270 NON-COVERED ITEM OR SERVICE: HCPCS | Mod: UD | Performed by: NURSE PRACTITIONER

## 2023-04-28 PROCEDURE — 700111 HCHG RX REV CODE 636 W/ 250 OVERRIDE (IP): Mod: UD | Performed by: NURSE PRACTITIONER

## 2023-04-28 PROCEDURE — RXMED WILLOW AMBULATORY MEDICATION CHARGE: Performed by: NURSE PRACTITIONER

## 2023-04-28 RX ORDER — HYDROCODONE BITARTRATE AND ACETAMINOPHEN 5; 325 MG/1; MG/1
1 TABLET ORAL EVERY 4 HOURS PRN
Status: DISCONTINUED | OUTPATIENT
Start: 2023-04-28 | End: 2023-04-28 | Stop reason: HOSPADM

## 2023-04-28 RX ORDER — HYDROCODONE BITARTRATE AND ACETAMINOPHEN 5; 325 MG/1; MG/1
TABLET ORAL
Qty: 30 TABLET | Refills: 0 | OUTPATIENT
Start: 2023-04-28 | End: 2023-08-10

## 2023-04-28 RX ORDER — HYDROCODONE BITARTRATE AND ACETAMINOPHEN 5; 325 MG/1; MG/1
1 TABLET ORAL EVERY 4 HOURS PRN
Qty: 30 TABLET | Refills: 0
Start: 2023-04-28 | End: 2023-05-03

## 2023-04-28 RX ADMIN — LISINOPRIL 40 MG: 20 TABLET ORAL at 05:36

## 2023-04-28 RX ADMIN — ACETAMINOPHEN 1000 MG: 500 TABLET, FILM COATED ORAL at 05:36

## 2023-04-28 RX ADMIN — HYDROCODONE BITARTRATE AND ACETAMINOPHEN 1 TABLET: 5; 325 TABLET ORAL at 08:36

## 2023-04-28 RX ADMIN — METHOCARBAMOL 750 MG: 750 TABLET ORAL at 00:54

## 2023-04-28 RX ADMIN — METHOCARBAMOL 750 MG: 750 TABLET ORAL at 12:17

## 2023-04-28 RX ADMIN — CEFAZOLIN 2 G: 2 INJECTION, POWDER, FOR SOLUTION INTRAMUSCULAR; INTRAVENOUS at 03:18

## 2023-04-28 RX ADMIN — BUPROPION HYDROCHLORIDE 150 MG: 150 TABLET, EXTENDED RELEASE ORAL at 05:36

## 2023-04-28 RX ADMIN — AMLODIPINE BESYLATE 5 MG: 5 TABLET ORAL at 05:36

## 2023-04-28 ASSESSMENT — ACTIVITIES OF DAILY LIVING (ADL): TOILETING: INDEPENDENT

## 2023-04-28 ASSESSMENT — COGNITIVE AND FUNCTIONAL STATUS - GENERAL
STANDING UP FROM CHAIR USING ARMS: A LITTLE
HELP NEEDED FOR BATHING: A LITTLE
DRESSING REGULAR UPPER BODY CLOTHING: A LITTLE
SUGGESTED CMS G CODE MODIFIER MOBILITY: CK
WALKING IN HOSPITAL ROOM: A LITTLE
CLIMB 3 TO 5 STEPS WITH RAILING: A LITTLE
MOVING TO AND FROM BED TO CHAIR: A LITTLE
TOILETING: A LITTLE
SUGGESTED CMS G CODE MODIFIER DAILY ACTIVITY: CK
MOBILITY SCORE: 18
DAILY ACTIVITIY SCORE: 19
DRESSING REGULAR LOWER BODY CLOTHING: A LITTLE
TURNING FROM BACK TO SIDE WHILE IN FLAT BAD: A LITTLE
MOVING FROM LYING ON BACK TO SITTING ON SIDE OF FLAT BED: A LITTLE
PERSONAL GROOMING: A LITTLE

## 2023-04-28 ASSESSMENT — GAIT ASSESSMENTS
GAIT LEVEL OF ASSIST: SUPERVISED
DISTANCE (FEET): 200
DEVIATION: BRADYKINETIC

## 2023-04-28 ASSESSMENT — PAIN DESCRIPTION - PAIN TYPE: TYPE: SURGICAL PAIN

## 2023-04-28 NOTE — CARE PLAN
The patient is Stable - Low risk of patient condition declining or worsening    Shift Goals  Clinical Goals: Drain output <30cc, remove drain  Patient Goals: Go home  Family Goals: N/A    Progress made toward(s) clinical / shift goals: Waiting for provider to reassess, pt cleared by PT/OT. Ambulating without difficulty, tolerating diet, and pain controlled with medications.       Problem: Pain - Standard  Goal: Alleviation of pain or a reduction in pain to the patient’s comfort goal  Outcome: Progressing     Problem: Knowledge Deficit - Standard  Goal: Patient and family/care givers will demonstrate understanding of plan of care, disease process/condition, diagnostic tests and medications  Outcome: Progressing     Problem: Fluid Volume  Goal: Fluid volume balance will be maintained  Outcome: Progressing     Problem: Gastrointestinal Irritability  Goal: Nausea and vomiting will be absent or improve  Outcome: Progressing

## 2023-04-28 NOTE — THERAPY
Occupational Therapy   Initial Evaluation     Patient Name: Martine Godinez  Age:  42 y.o., Sex:  female  Medical Record #: [id number]  Today's Date: 4/28/2023     Precautions  Precautions: Fall Risk, Spinal / Back Precautions , Cervical Collar    Comments: Collar when OOB >5 min    Assessment    Patient is 42 y.o. female s/p C5-6 ACDF. Other pertinent medical history includes C5-6 herniated disc with cord compression and myelopathy. Pt seen for OT evaluation. Pt donned/doffed collar, donned/doffed socks, stood to wash hands/face, and performed toilet transfer w/ supv-SBA. Pt reported that she lives with her uncle, fiance, and 18 year old daughter. There is normally someone who is home who can assist her if needed. Pt educated regarding the role of OT, shower safety, DME/AE recommendations (handout provided), brace wear/care, and spinal precautions in relation to ADLs. No further OT needs anticipated at this time.     Plan     Pt seen for OT evaluation only.    DC Equipment Recommendations: Tub / Shower Seat, Reacher  Discharge Recommendations: Anticipate that the patient will have no further occupational therapy needs after discharge from the hospital      Objective     04/28/23 0824   Prior Living Situation   Prior Services Home-Independent   Housing / Facility 1 Story Apartment / Condo   Steps Into Home 0   Steps In Home 0   Bathroom Set up Bathtub / Shower Combination   Equipment Owned Single Point Cane;Front-Wheel Walker   Lives with - Patient's Self Care Capacity Spouse;Adult Children;Related Adult   Comments Pt lives with her uncle, fiance, and 18 year old daughter. Pt reported that therre is usually someone home at all times.   Prior Level of ADL Function   Self Feeding Independent   Grooming / Hygiene Independent   Bathing Independent   Dressing Independent   Toileting Independent   Prior Level of IADL Function   Medication Management Independent   Laundry Independent   Kitchen Mobility Independent    Finances Independent   Home Management Independent   Shopping Independent   Prior Level Of Mobility Independent Without Device in Community;Independent Without Device in Home   Driving / Transportation Relatives / Others Provide Transportation   Occupation (Pre-Hospital Vocational) Not Employed  (attempting to get disability)   Pain   Pain Scales 0 to 10 Scale    Pain 0 - 10 Group   Therapist Pain Assessment Post Activity Pain Same as Prior to Activity;Nurse Notified  (no pain reported, not rated, agreeable to eval)   Non Verbal Descriptors   Non Verbal Scale  Calm   Cognition    Cognition / Consciousness WDL   Level of Consciousness Alert   Comments Pleasant and cooperative, receptive to education   Passive ROM Upper Body   Passive ROM Upper Body WDL   Active ROM Upper Body   Active ROM Upper Body  WDL   Strength Upper Body   Upper Body Strength  WDL   Sensation Upper Body   Upper Extremity Sensation  WDL   Comments reported same sensation for light touch in all regions B UE   Upper Body Muscle Tone   Upper Body Muscle Tone  WDL   Coordination Upper Body   Comments WFL, not formally assessed   Balance Assessment   Sitting Balance (Static) Good   Sitting Balance (Dynamic) Good   Standing Balance (Static) Fair +   Standing Balance (Dynamic) Fair +   Weight Shift Sitting Good   Weight Shift Standing Fair   Comments no AD, no LOB   Bed Mobility    Comments   (EOB prior, up to chair post)   ADL Assessment   Grooming   (washed hands/face)   Upper Body Dressing Supervision  (don/doff brace)   Lower Body Dressing Standby Assist  (don/doff socks)   Toileting   (toilet transfer only)   Functional Mobility   Sit to Stand Supervised   Bed, Chair, Wheelchair Transfer Supervised   Toilet Transfers Supervised   Mobility EOB>bathroom>chair   Comments w/ no AD   Visual Perception   Visual Perception  Not Tested   Activity Tolerance   Sitting in Chair up to chair post   Sitting Edge of Bed EOB prior   Standing >5 min   Comments no  overt pain or fatigue, no noted increased work of breathing   Education Group   Education Provided Role of Occupational Therapist;Activities of Daily Living;Spinal Precautions;Adaptive Equipment;Brace Wear and Care   Role of Occupational Therapist Patient Response Patient;Acceptance;Explanation;Verbal Demonstration   Spinal Precautions Patient Response Patient;Acceptance;Explanation;Demonstration;Action Demonstration;Verbal Demonstration;Handout   Brace Wear & Care Patient Response Patient;Acceptance;Explanation;Demonstration;Verbal Demonstration;Action Demonstration   ADL Patient Response Patient;Acceptance;Demonstration;Explanation;Verbal Demonstration;Action Demonstration   Adaptive Equipment Patient Response Patient;Acceptance;Explanation;Demonstration;Verbal Demonstration;Action Demonstration

## 2023-04-28 NOTE — CARE PLAN
The patient is Stable - Low risk of patient condition declining or worsening    Shift Goals  Clinical Goals: pain control, nausea control  Patient Goals: pain control, rest  Family Goals: N/A    Progress made toward(s) clinical / shift goals: Plan of care and medications discussed with pt. Pt reported of neck surgical pain 5/10. Medicated per MAR of pain. Pain tolerable after intervention. IV fluids stopped, pt taking adequate po and no nausea/vomiting tonight      Problem: Knowledge Deficit - Standard  Goal: Patient and family/care givers will demonstrate understanding of plan of care, disease process/condition, diagnostic tests and medications  Outcome: Progressing     Problem: Pain - Standard  Goal: Alleviation of pain or a reduction in pain to the patient’s comfort goal  Outcome: Progressing    Problem: Fluid Volume  Goal: Fluid volume balance will be maintained  Outcome: Progressing     Problem: Gastrointestinal Irritability  Goal: Nausea and vomiting will be absent or improve  Outcome: Progressing

## 2023-04-28 NOTE — PROGRESS NOTES
Neurosurgery Progress Note    Subjective:  No acute events over night  Posterior neck pain, feels muscular  Swallowing ok  Arm symptoms improved     Exam:  A/Ox4  BUE 5/5  Incision cdi and flat  HVAC 20/8hr    BP  Min: 132/77  Max: 199/108  Pulse  Av.3  Min: 76  Max: 95  Resp  Avg: 15.7  Min: 12  Max: 18  Temp  Av.5 °C (97.7 °F)  Min: 36.2 °C (97.2 °F)  Max: 36.8 °C (98.2 °F)  Monitored Temp 2  Av.6 °C (97.8 °F)  Min: 36.4 °C (97.5 °F)  Max: 36.7 °C (98.1 °F)  SpO2  Av.1 %  Min: 90 %  Max: 99 %    No data recorded                      Intake/Output                         23 0700 - 23 0659 23 07 - 23 0659      Total  Total                 Intake    I.V.  650  -- 650  --  -- --    Volume (mL) (lactated ringers infusion) 650 -- 650 -- -- --    Total Intake 650 -- 650 -- -- --       Output    Urine  1  -- 1  --  -- --    Number of Times Voided 1 x -- 1 x -- -- --    Urine Void (mL) 1 -- 1 -- -- --    Emesis  100  -- 100  --  -- --    Emesis 100 -- 100 -- -- --    Drains  --  20 20  --  -- --    Output (mL) (Closed/Suction Drain Anterior Neck Hemovac) -- 20 20 -- -- --    Stool  --  -- --  --  -- --    Number of Times Stooled 0 x -- 0 x -- -- --    Blood  100  -- 100  --  -- --    Est. Blood Loss 100 -- 100 -- -- --    Total Output 201 20 221 -- -- --       Net I/O     449 -20 429 -- -- --              Intake/Output Summary (Last 24 hours) at 2023 0711  Last data filed at 2023 0400  Gross per 24 hour   Intake 650 ml   Output 221 ml   Net 429 ml             HYDROcodone-acetaminophen  1 Tablet Q4HRS PRN    amLODIPine  5 mg DAILY    buPROPion SR  150 mg BID    insulin GLARGINE  8 Units QHS    lisinopril  40 mg DAILY    methocarbamol  750 mg Q8HRS PRN    Pharmacy Consult Request  1 Each PHARMACY TO DOSE    MD ALERT...DO NOT ADMINISTER NSAIDS or ASPIRIN unless ORDERED By Neurosurgery  1 Each PRN    docusate sodium  100 mg BID     senna-docusate  1 Tablet Nightly    senna-docusate  1 Tablet Q24HRS PRN    polyethylene glycol/lytes  1 Packet BID PRN    magnesium hydroxide  30 mL QDAY PRN    bisacodyl  10 mg Q24HRS PRN    sodium phosphate  1 Each Once PRN    0.9 % NaCl with KCl 20 mEq 1,000 mL   Continuous    acetaminophen  1,000 mg Q8HRS    diphenhydrAMINE  25 mg Q6HRS PRN    Or    diphenhydrAMINE  25 mg Q6HRS PRN    ondansetron  4 mg Q4HRS PRN    ondansetron  4 mg Q4HRS PRN    promethazine  12.5-25 mg Q4HRS PRN    promethazine  12.5-25 mg Q4HRS PRN    prochlorperazine  5-10 mg Q4HRS PRN    labetalol  10 mg Q HOUR PRN    hydrALAZINE  10 mg Q HOUR PRN    benzocaine-menthol  1 Lozenge Q2HRS PRN       Assessment and Plan:  Hospital day #2  POD #1 C5-6 ACDF  Prophylactic anticoagulation: no         Start date/time: na    VSS  Doing well  +nausea yesterday, resolved today  Advance diet as tolerated  Switch pain meds to [place]  Pending discharge PT/OT clearance and tolerating po

## 2023-04-28 NOTE — DIETARY
NUTRITION SERVICES: BMI - Pt with BMI >40 (=Body mass index is 56.88 kg/m².), Class III obesity. Weight loss counseling not appropriate in acute care setting. RECOMMEND - If appropriate at DC please refer to outpatient nutrition services for weight management.

## 2023-04-28 NOTE — THERAPY
Physical Therapy   Initial Evaluation     Patient Name: Martine Godinez  Age:  42 y.o., Sex:  female  Medical Record #: [id number]  Today's Date: 4/28/2023     Precautions  Precautions: Spinal / Back Precautions ;Cervical Collar    Comments: collar when OOB >5 min    Assessment  Patient is a 42 y.o. female with C5-6 herniated disc with cord compression and myelopathy admitted s/p C5-6 ACDF on 4/27. PT eval complete, pt currently presents at her functional baseline and completed all mobility at SPV level with no AD. Pt educated and provided handout on spinal precautions including log roll; pt receptive and stated her intent to follow as instructed. Pt reports having supportive family at home and should be able to safely DC there with outpatient PT follow up once medically cleared. Pt has no further acute care PT needs and PT will sign off.     Plan    Physical Therapy Initial Treatment Plan   Duration: Evaluation only    DC Equipment Recommendations: None  Discharge Recommendations: Recommend outpatient physical therapy services to address higher level deficits         Vitals   O2 (LPM) 0   O2 Delivery Device None - Room Air   Pain 0 - 10 Group   Therapist Pain Assessment   (no c/o pain)   Prior Living Situation   Prior Services Home-Independent   Housing / Facility 1 Story Apartment / Condo   Steps Into Home 0   Steps In Home 0   Equipment Owned Single Point Cane   Lives with - Patient's Self Care Capacity Spouse;Adult Children;Related Adult   Comments reports living with her fiance, 18 y.o. dtr, and uncle. notes someone will be there to help as needed   Prior Level of Functional Mobility   Bed Mobility Independent   Transfer Status Independent   Ambulation Independent   Ambulation Distance community   Assistive Devices Used None   Stairs Independent   Cognition    Cognition / Consciousness WDL   Level of Consciousness Alert   Comments very pleasant and participatory, receptive to education   Active ROM Lower Body     Active ROM Lower Body  WDL   Strength Lower Body   Lower Body Strength  WDL   Coordination Lower Body    Coordination Lower Body  WDL   Balance Assessment   Sitting Balance (Static) Good   Sitting Balance (Dynamic) Good   Standing Balance (Static) Fair +   Standing Balance (Dynamic) Fair +   Weight Shift Sitting Good   Weight Shift Standing Fair   Comments no AD   Bed Mobility    Comments pt up pre/post. no concerns with bed mobility and demonstrates ability to complete as needed   Gait Analysis   Gait Level Of Assist Supervised   Assistive Device None   Distance (Feet) 200   # of Times Distance was Traveled 1   Deviation Bradykinetic   # of Stairs Climbed 10   Level of Assist with Stairs Supervised   Weight Bearing Status no restrictions   Functional Mobility   Sit to Stand Supervised   Bed, Chair, Wheelchair Transfer Supervised   Toilet Transfers Supervised   Mobility no AD   How much difficulty does the patient currently have...   Turning over in bed (including adjusting bedclothes, sheets and blankets)? 3   Sitting down on and standing up from a chair with arms (e.g., wheelchair, bedside commode, etc.) 3   Moving from lying on back to sitting on the side of the bed? 3   How much help from another person does the patient currently need...   Moving to and from a bed to a chair (including a wheelchair)? 3   Need to walk in a hospital room? 3   Climbing 3-5 steps with a railing? 3   6 clicks Mobility Score 18   Activity Tolerance   Sitting in Chair 5 min/post session   Sitting Edge of Bed pre session   Standing 5 min   Comments no overt pain, SOB, or fatigue   Education Group   Education Provided Spine Precautions;Role of Physical Therapist   Spine Precautions Patient Response Patient;Acceptance;Explanation;Verbal Demonstration;Action Demonstration   Role of Physical Therapist Patient Response Patient;Acceptance;Explanation;Verbal Demonstration   Physical Therapy Initial Treatment Plan    Duration Evaluation only    Problem List    Problems None   Anticipated Discharge Equipment and Recommendations   DC Equipment Recommendations None   Discharge Recommendations Recommend outpatient physical therapy services to address higher level deficits   Interdisciplinary Plan of Care Collaboration   IDT Collaboration with  Nursing;Occupational Therapist   Patient Position at End of Therapy Seated;Call Light within Reach;Tray Table within Reach;Phone within Reach   Collaboration Comments RN updated   Session Information   Date / Session Number  4/28- 1x only

## 2023-04-28 NOTE — PROGRESS NOTES
Patient is being discharged home. Patient is A&Ox4. IV removed on unit. Discharge instructions provided to patient and reviewed. Patient verbalized understanding and all questions and concerns were addressed. Patient states she has follow up scheduled with surgeon.

## 2023-08-10 ENCOUNTER — HOSPITAL ENCOUNTER (INPATIENT)
Facility: MEDICAL CENTER | Age: 43
LOS: 3 days | DRG: 918 | End: 2023-08-14
Attending: STUDENT IN AN ORGANIZED HEALTH CARE EDUCATION/TRAINING PROGRAM | Admitting: INTERNAL MEDICINE
Payer: MEDICAID

## 2023-08-10 DIAGNOSIS — E11.69 TYPE 2 DIABETES MELLITUS WITH OTHER SPECIFIED COMPLICATION, WITH LONG-TERM CURRENT USE OF INSULIN (HCC): ICD-10-CM

## 2023-08-10 DIAGNOSIS — T50.902A INTENTIONAL DRUG OVERDOSE, INITIAL ENCOUNTER (HCC): ICD-10-CM

## 2023-08-10 DIAGNOSIS — Z79.4 TYPE 2 DIABETES MELLITUS WITH OTHER SPECIFIED COMPLICATION, WITH LONG-TERM CURRENT USE OF INSULIN (HCC): ICD-10-CM

## 2023-08-10 DIAGNOSIS — T14.91XA SUICIDE ATTEMPT (HCC): ICD-10-CM

## 2023-08-10 PROBLEM — E66.01 SEVERE OBESITY (HCC): Status: ACTIVE | Noted: 2023-08-10

## 2023-08-10 PROBLEM — E11.9 TYPE 2 DIABETES MELLITUS (HCC): Status: ACTIVE | Noted: 2023-08-10

## 2023-08-10 PROBLEM — T42.6X1A GABAPENTIN OVERDOSE: Status: ACTIVE | Noted: 2023-08-10

## 2023-08-10 PROBLEM — I10 ESSENTIAL HYPERTENSION: Status: ACTIVE | Noted: 2023-08-10

## 2023-08-10 LAB
ALBUMIN SERPL BCP-MCNC: 4.1 G/DL (ref 3.2–4.9)
ALBUMIN/GLOB SERPL: 1.6 G/DL
ALP SERPL-CCNC: 48 U/L (ref 30–99)
ALT SERPL-CCNC: 20 U/L (ref 2–50)
AMPHET UR QL SCN: NEGATIVE
ANION GAP SERPL CALC-SCNC: 15 MMOL/L (ref 7–16)
APAP SERPL-MCNC: <5 UG/ML (ref 10–30)
AST SERPL-CCNC: 28 U/L (ref 12–45)
BARBITURATES UR QL SCN: NEGATIVE
BASOPHILS # BLD AUTO: 0.3 % (ref 0–1.8)
BASOPHILS # BLD: 0.05 K/UL (ref 0–0.12)
BENZODIAZ UR QL SCN: NEGATIVE
BILIRUB SERPL-MCNC: 0.4 MG/DL (ref 0.1–1.5)
BUN SERPL-MCNC: 9 MG/DL (ref 8–22)
BZE UR QL SCN: NEGATIVE
CALCIUM ALBUM COR SERPL-MCNC: 8.9 MG/DL (ref 8.5–10.5)
CALCIUM SERPL-MCNC: 9 MG/DL (ref 8.5–10.5)
CANNABINOIDS UR QL SCN: POSITIVE
CHLORIDE SERPL-SCNC: 102 MMOL/L (ref 96–112)
CK SERPL-CCNC: 287 U/L (ref 0–154)
CO2 SERPL-SCNC: 21 MMOL/L (ref 20–33)
CREAT SERPL-MCNC: 0.88 MG/DL (ref 0.5–1.4)
EKG IMPRESSION: NORMAL
EOSINOPHIL # BLD AUTO: 0.14 K/UL (ref 0–0.51)
EOSINOPHIL NFR BLD: 0.8 % (ref 0–6.9)
ERYTHROCYTE [DISTWIDTH] IN BLOOD BY AUTOMATED COUNT: 40.3 FL (ref 35.9–50)
ETHANOL BLD-MCNC: <10.1 MG/DL
FENTANYL UR QL: NEGATIVE
GFR SERPLBLD CREATININE-BSD FMLA CKD-EPI: 85 ML/MIN/1.73 M 2
GLOBULIN SER CALC-MCNC: 2.5 G/DL (ref 1.9–3.5)
GLUCOSE SERPL-MCNC: 256 MG/DL (ref 65–99)
HCT VFR BLD AUTO: 39.1 % (ref 37–47)
HGB BLD-MCNC: 12.9 G/DL (ref 12–16)
IMM GRANULOCYTES # BLD AUTO: 0.08 K/UL (ref 0–0.11)
IMM GRANULOCYTES NFR BLD AUTO: 0.4 % (ref 0–0.9)
LIPASE SERPL-CCNC: 55 U/L (ref 11–82)
LYMPHOCYTES # BLD AUTO: 2.09 K/UL (ref 1–4.8)
LYMPHOCYTES NFR BLD: 11.4 % (ref 22–41)
MAGNESIUM SERPL-MCNC: 1.8 MG/DL (ref 1.5–2.5)
MCH RBC QN AUTO: 27.3 PG (ref 27–33)
MCHC RBC AUTO-ENTMCNC: 33 G/DL (ref 32.2–35.5)
MCV RBC AUTO: 82.7 FL (ref 81.4–97.8)
METHADONE UR QL SCN: NEGATIVE
MONOCYTES # BLD AUTO: 0.73 K/UL (ref 0–0.85)
MONOCYTES NFR BLD AUTO: 4 % (ref 0–13.4)
NEUTROPHILS # BLD AUTO: 15.18 K/UL (ref 1.82–7.42)
NEUTROPHILS NFR BLD: 83.1 % (ref 44–72)
NRBC # BLD AUTO: 0 K/UL
NRBC BLD-RTO: 0 /100 WBC (ref 0–0.2)
OPIATES UR QL SCN: NEGATIVE
OSMOLALITY SERPL: 292 MOSM/KG H2O (ref 278–298)
OXYCODONE UR QL SCN: NEGATIVE
PCP UR QL SCN: NEGATIVE
PHOSPHATE SERPL-MCNC: 2.9 MG/DL (ref 2.5–4.5)
PLATELET # BLD AUTO: 303 K/UL (ref 164–446)
PMV BLD AUTO: 10 FL (ref 9–12.9)
POC BREATHALIZER: 0 PERCENT (ref 0–0.01)
POTASSIUM SERPL-SCNC: 3.6 MMOL/L (ref 3.6–5.5)
PROPOXYPH UR QL SCN: NEGATIVE
PROT SERPL-MCNC: 6.6 G/DL (ref 6–8.2)
RBC # BLD AUTO: 4.73 M/UL (ref 4.2–5.4)
SALICYLATES SERPL-MCNC: <1 MG/DL (ref 15–25)
SODIUM SERPL-SCNC: 138 MMOL/L (ref 135–145)
WBC # BLD AUTO: 18.3 K/UL (ref 4.8–10.8)

## 2023-08-10 PROCEDURE — 83690 ASSAY OF LIPASE: CPT

## 2023-08-10 PROCEDURE — 83930 ASSAY OF BLOOD OSMOLALITY: CPT

## 2023-08-10 PROCEDURE — 83735 ASSAY OF MAGNESIUM: CPT

## 2023-08-10 PROCEDURE — 36415 COLL VENOUS BLD VENIPUNCTURE: CPT

## 2023-08-10 PROCEDURE — 80053 COMPREHEN METABOLIC PANEL: CPT

## 2023-08-10 PROCEDURE — 82550 ASSAY OF CK (CPK): CPT

## 2023-08-10 PROCEDURE — 80143 DRUG ASSAY ACETAMINOPHEN: CPT

## 2023-08-10 PROCEDURE — 80179 DRUG ASSAY SALICYLATE: CPT

## 2023-08-10 PROCEDURE — 302970 POC BREATHALIZER

## 2023-08-10 PROCEDURE — 93005 ELECTROCARDIOGRAM TRACING: CPT | Performed by: STUDENT IN AN ORGANIZED HEALTH CARE EDUCATION/TRAINING PROGRAM

## 2023-08-10 PROCEDURE — 700105 HCHG RX REV CODE 258: Mod: JZ,UD | Performed by: STUDENT IN AN ORGANIZED HEALTH CARE EDUCATION/TRAINING PROGRAM

## 2023-08-10 PROCEDURE — 84100 ASSAY OF PHOSPHORUS: CPT

## 2023-08-10 PROCEDURE — G0378 HOSPITAL OBSERVATION PER HR: HCPCS

## 2023-08-10 PROCEDURE — 99223 1ST HOSP IP/OBS HIGH 75: CPT | Performed by: INTERNAL MEDICINE

## 2023-08-10 PROCEDURE — 82077 ASSAY SPEC XCP UR&BREATH IA: CPT

## 2023-08-10 PROCEDURE — 80307 DRUG TEST PRSMV CHEM ANLYZR: CPT

## 2023-08-10 PROCEDURE — 99285 EMERGENCY DEPT VISIT HI MDM: CPT

## 2023-08-10 PROCEDURE — 85025 COMPLETE CBC W/AUTO DIFF WBC: CPT

## 2023-08-10 RX ORDER — BISACODYL 10 MG
10 SUPPOSITORY, RECTAL RECTAL
Status: DISCONTINUED | OUTPATIENT
Start: 2023-08-10 | End: 2023-08-14 | Stop reason: HOSPADM

## 2023-08-10 RX ORDER — POLYETHYLENE GLYCOL 3350 17 G/17G
1 POWDER, FOR SOLUTION ORAL
Status: DISCONTINUED | OUTPATIENT
Start: 2023-08-10 | End: 2023-08-14 | Stop reason: HOSPADM

## 2023-08-10 RX ORDER — BUPROPION HYDROCHLORIDE 150 MG/1
150 TABLET, EXTENDED RELEASE ORAL 2 TIMES DAILY
Status: DISCONTINUED | OUTPATIENT
Start: 2023-08-11 | End: 2023-08-14 | Stop reason: HOSPADM

## 2023-08-10 RX ORDER — DEXTROSE MONOHYDRATE 25 G/50ML
25 INJECTION, SOLUTION INTRAVENOUS
Status: DISCONTINUED | OUTPATIENT
Start: 2023-08-10 | End: 2023-08-14 | Stop reason: HOSPADM

## 2023-08-10 RX ORDER — SODIUM CHLORIDE, SODIUM LACTATE, POTASSIUM CHLORIDE, CALCIUM CHLORIDE 600; 310; 30; 20 MG/100ML; MG/100ML; MG/100ML; MG/100ML
1000 INJECTION, SOLUTION INTRAVENOUS ONCE
Status: COMPLETED | OUTPATIENT
Start: 2023-08-10 | End: 2023-08-10

## 2023-08-10 RX ORDER — ONDANSETRON 4 MG/1
4 TABLET, ORALLY DISINTEGRATING ORAL EVERY 4 HOURS PRN
Status: DISCONTINUED | OUTPATIENT
Start: 2023-08-10 | End: 2023-08-14 | Stop reason: HOSPADM

## 2023-08-10 RX ORDER — ACETAMINOPHEN 325 MG/1
650 TABLET ORAL EVERY 6 HOURS PRN
Status: DISCONTINUED | OUTPATIENT
Start: 2023-08-10 | End: 2023-08-14 | Stop reason: HOSPADM

## 2023-08-10 RX ORDER — TIZANIDINE 4 MG/1
4 TABLET ORAL
COMMUNITY

## 2023-08-10 RX ORDER — PROMETHAZINE HYDROCHLORIDE 25 MG/1
12.5-25 SUPPOSITORY RECTAL EVERY 4 HOURS PRN
Status: DISCONTINUED | OUTPATIENT
Start: 2023-08-10 | End: 2023-08-14 | Stop reason: HOSPADM

## 2023-08-10 RX ORDER — ONDANSETRON 2 MG/ML
4 INJECTION INTRAMUSCULAR; INTRAVENOUS EVERY 4 HOURS PRN
Status: DISCONTINUED | OUTPATIENT
Start: 2023-08-10 | End: 2023-08-14 | Stop reason: HOSPADM

## 2023-08-10 RX ORDER — PROMETHAZINE HYDROCHLORIDE 25 MG/1
12.5-25 TABLET ORAL EVERY 4 HOURS PRN
Status: DISCONTINUED | OUTPATIENT
Start: 2023-08-10 | End: 2023-08-14 | Stop reason: HOSPADM

## 2023-08-10 RX ORDER — AMOXICILLIN 250 MG
2 CAPSULE ORAL 2 TIMES DAILY
Status: DISCONTINUED | OUTPATIENT
Start: 2023-08-11 | End: 2023-08-14 | Stop reason: HOSPADM

## 2023-08-10 RX ORDER — PROCHLORPERAZINE EDISYLATE 5 MG/ML
5-10 INJECTION INTRAMUSCULAR; INTRAVENOUS EVERY 4 HOURS PRN
Status: DISCONTINUED | OUTPATIENT
Start: 2023-08-10 | End: 2023-08-14 | Stop reason: HOSPADM

## 2023-08-10 RX ORDER — METOPROLOL SUCCINATE 50 MG/1
50 TABLET, EXTENDED RELEASE ORAL DAILY
COMMUNITY
Start: 2023-06-13

## 2023-08-10 RX ORDER — AMLODIPINE BESYLATE 10 MG/1
10 TABLET ORAL DAILY
COMMUNITY
Start: 2023-06-18

## 2023-08-10 RX ADMIN — SODIUM CHLORIDE, POTASSIUM CHLORIDE, SODIUM LACTATE AND CALCIUM CHLORIDE 1000 ML: 600; 310; 30; 20 INJECTION, SOLUTION INTRAVENOUS at 19:15

## 2023-08-10 ASSESSMENT — ENCOUNTER SYMPTOMS
PALPITATIONS: 0
HEADACHES: 0
FEVER: 0
NAUSEA: 0
SEIZURES: 0
DOUBLE VISION: 0
COUGH: 0
CHILLS: 0
VOMITING: 0
SHORTNESS OF BREATH: 0
ABDOMINAL PAIN: 0
HALLUCINATIONS: 0
SORE THROAT: 0
BLURRED VISION: 0
BACK PAIN: 0
FALLS: 0

## 2023-08-10 ASSESSMENT — FIBROSIS 4 INDEX: FIB4 SCORE: 0.71

## 2023-08-11 PROBLEM — M79.89 LEG SWELLING: Status: ACTIVE | Noted: 2023-08-11

## 2023-08-11 LAB
ALBUMIN SERPL BCP-MCNC: 3.6 G/DL (ref 3.2–4.9)
BASOPHILS # BLD AUTO: 0.5 % (ref 0–1.8)
BASOPHILS # BLD: 0.04 K/UL (ref 0–0.12)
BUN SERPL-MCNC: 10 MG/DL (ref 8–22)
CALCIUM ALBUM COR SERPL-MCNC: 9.4 MG/DL (ref 8.5–10.5)
CALCIUM SERPL-MCNC: 9.1 MG/DL (ref 8.5–10.5)
CHLORIDE SERPL-SCNC: 104 MMOL/L (ref 96–112)
CO2 SERPL-SCNC: 22 MMOL/L (ref 20–33)
CREAT SERPL-MCNC: 0.8 MG/DL (ref 0.5–1.4)
EKG IMPRESSION: NORMAL
EOSINOPHIL # BLD AUTO: 0.22 K/UL (ref 0–0.51)
EOSINOPHIL NFR BLD: 2.9 % (ref 0–6.9)
ERYTHROCYTE [DISTWIDTH] IN BLOOD BY AUTOMATED COUNT: 41.8 FL (ref 35.9–50)
GFR SERPLBLD CREATININE-BSD FMLA CKD-EPI: 95 ML/MIN/1.73 M 2
GLUCOSE BLD STRIP.AUTO-MCNC: 122 MG/DL (ref 65–99)
GLUCOSE BLD STRIP.AUTO-MCNC: 127 MG/DL (ref 65–99)
GLUCOSE BLD STRIP.AUTO-MCNC: 130 MG/DL (ref 65–99)
GLUCOSE BLD STRIP.AUTO-MCNC: 141 MG/DL (ref 65–99)
GLUCOSE BLD STRIP.AUTO-MCNC: 159 MG/DL (ref 65–99)
GLUCOSE SERPL-MCNC: 121 MG/DL (ref 65–99)
HCT VFR BLD AUTO: 35.8 % (ref 37–47)
HGB BLD-MCNC: 11.6 G/DL (ref 12–16)
IMM GRANULOCYTES # BLD AUTO: 0.01 K/UL (ref 0–0.11)
IMM GRANULOCYTES NFR BLD AUTO: 0.1 % (ref 0–0.9)
LYMPHOCYTES # BLD AUTO: 3.31 K/UL (ref 1–4.8)
LYMPHOCYTES NFR BLD: 43 % (ref 22–41)
MAGNESIUM SERPL-MCNC: 2.1 MG/DL (ref 1.5–2.5)
MCH RBC QN AUTO: 27 PG (ref 27–33)
MCHC RBC AUTO-ENTMCNC: 32.4 G/DL (ref 32.2–35.5)
MCV RBC AUTO: 83.3 FL (ref 81.4–97.8)
MONOCYTES # BLD AUTO: 0.47 K/UL (ref 0–0.85)
MONOCYTES NFR BLD AUTO: 6.1 % (ref 0–13.4)
NEUTROPHILS # BLD AUTO: 3.65 K/UL (ref 1.82–7.42)
NEUTROPHILS NFR BLD: 47.4 % (ref 44–72)
NRBC # BLD AUTO: 0 K/UL
NRBC BLD-RTO: 0 /100 WBC (ref 0–0.2)
NT-PROBNP SERPL IA-MCNC: 133 PG/ML (ref 0–125)
PHOSPHATE SERPL-MCNC: 3.4 MG/DL (ref 2.5–4.5)
PLATELET # BLD AUTO: 273 K/UL (ref 164–446)
PMV BLD AUTO: 9.7 FL (ref 9–12.9)
POTASSIUM SERPL-SCNC: 3.8 MMOL/L (ref 3.6–5.5)
RBC # BLD AUTO: 4.3 M/UL (ref 4.2–5.4)
SODIUM SERPL-SCNC: 138 MMOL/L (ref 135–145)
WBC # BLD AUTO: 7.7 K/UL (ref 4.8–10.8)

## 2023-08-11 PROCEDURE — 770020 HCHG ROOM/CARE - TELE (206)

## 2023-08-11 PROCEDURE — A9270 NON-COVERED ITEM OR SERVICE: HCPCS | Mod: UD

## 2023-08-11 PROCEDURE — 83735 ASSAY OF MAGNESIUM: CPT

## 2023-08-11 PROCEDURE — 85025 COMPLETE CBC W/AUTO DIFF WBC: CPT

## 2023-08-11 PROCEDURE — 82962 GLUCOSE BLOOD TEST: CPT

## 2023-08-11 PROCEDURE — 80069 RENAL FUNCTION PANEL: CPT

## 2023-08-11 PROCEDURE — 700102 HCHG RX REV CODE 250 W/ 637 OVERRIDE(OP): Mod: UD | Performed by: INTERNAL MEDICINE

## 2023-08-11 PROCEDURE — 700102 HCHG RX REV CODE 250 W/ 637 OVERRIDE(OP): Mod: UD

## 2023-08-11 PROCEDURE — 93005 ELECTROCARDIOGRAM TRACING: CPT

## 2023-08-11 PROCEDURE — 36415 COLL VENOUS BLD VENIPUNCTURE: CPT

## 2023-08-11 PROCEDURE — 99232 SBSQ HOSP IP/OBS MODERATE 35: CPT | Mod: FS | Performed by: INTERNAL MEDICINE

## 2023-08-11 PROCEDURE — 96372 THER/PROPH/DIAG INJ SC/IM: CPT

## 2023-08-11 PROCEDURE — 93010 ELECTROCARDIOGRAM REPORT: CPT | Performed by: INTERNAL MEDICINE

## 2023-08-11 PROCEDURE — 83880 ASSAY OF NATRIURETIC PEPTIDE: CPT

## 2023-08-11 PROCEDURE — A9270 NON-COVERED ITEM OR SERVICE: HCPCS | Mod: UD | Performed by: INTERNAL MEDICINE

## 2023-08-11 RX ORDER — ARIPIPRAZOLE 10 MG/1
5 TABLET ORAL DAILY
Status: DISCONTINUED | OUTPATIENT
Start: 2023-08-11 | End: 2023-08-14 | Stop reason: HOSPADM

## 2023-08-11 RX ORDER — LOPERAMIDE HYDROCHLORIDE 2 MG/1
2 CAPSULE ORAL 4 TIMES DAILY PRN
Status: DISCONTINUED | OUTPATIENT
Start: 2023-08-11 | End: 2023-08-14 | Stop reason: HOSPADM

## 2023-08-11 RX ADMIN — RIVAROXABAN 10 MG: 10 TABLET, FILM COATED ORAL at 01:03

## 2023-08-11 RX ADMIN — RIVAROXABAN 10 MG: 10 TABLET, FILM COATED ORAL at 17:40

## 2023-08-11 RX ADMIN — LOPERAMIDE HYDROCHLORIDE 2 MG: 2 CAPSULE ORAL at 13:19

## 2023-08-11 RX ADMIN — BUPROPION HYDROCHLORIDE 150 MG: 150 TABLET, EXTENDED RELEASE ORAL at 17:40

## 2023-08-11 RX ADMIN — ACETAMINOPHEN 650 MG: 325 TABLET, FILM COATED ORAL at 14:33

## 2023-08-11 RX ADMIN — INSULIN GLARGINE-YFGN 10 UNITS: 100 INJECTION, SOLUTION SUBCUTANEOUS at 01:00

## 2023-08-11 RX ADMIN — ARIPIPRAZOLE 5 MG: 10 TABLET ORAL at 12:30

## 2023-08-11 RX ADMIN — BUPROPION HYDROCHLORIDE 150 MG: 150 TABLET, EXTENDED RELEASE ORAL at 05:25

## 2023-08-11 RX ADMIN — INSULIN GLARGINE-YFGN 10 UNITS: 100 INJECTION, SOLUTION SUBCUTANEOUS at 17:40

## 2023-08-11 ASSESSMENT — ENCOUNTER SYMPTOMS
SPEECH CHANGE: 0
DEPRESSION: 1
NAUSEA: 0
EYES NEGATIVE: 1
FOCAL WEAKNESS: 0
CHILLS: 0
HALLUCINATIONS: 0
DIARRHEA: 1
FEVER: 0
FLANK PAIN: 0
MYALGIAS: 0
FALLS: 0
VOMITING: 0
SHORTNESS OF BREATH: 0
SENSORY CHANGE: 0
CONSTITUTIONAL NEGATIVE: 1

## 2023-08-11 ASSESSMENT — LIFESTYLE VARIABLES
ALCOHOL_USE: NO
AVERAGE NUMBER OF DAYS PER WEEK YOU HAVE A DRINK CONTAINING ALCOHOL: 0
HAVE PEOPLE ANNOYED YOU BY CRITICIZING YOUR DRINKING: NO
HAVE YOU EVER FELT YOU SHOULD CUT DOWN ON YOUR DRINKING: NO
TOTAL SCORE: 0
TOTAL SCORE: 0
CONSUMPTION TOTAL: NEGATIVE
EVER HAD A DRINK FIRST THING IN THE MORNING TO STEADY YOUR NERVES TO GET RID OF A HANGOVER: NO
TOTAL SCORE: 0
HOW MANY TIMES IN THE PAST YEAR HAVE YOU HAD 5 OR MORE DRINKS IN A DAY: 0
ON A TYPICAL DAY WHEN YOU DRINK ALCOHOL HOW MANY DRINKS DO YOU HAVE: 0
EVER FELT BAD OR GUILTY ABOUT YOUR DRINKING: NO
DOES PATIENT WANT TO STOP DRINKING: NO

## 2023-08-11 ASSESSMENT — COGNITIVE AND FUNCTIONAL STATUS - GENERAL
DAILY ACTIVITIY SCORE: 24
SUGGESTED CMS G CODE MODIFIER DAILY ACTIVITY: CH
MOBILITY SCORE: 24
SUGGESTED CMS G CODE MODIFIER MOBILITY: CH

## 2023-08-11 ASSESSMENT — PAIN DESCRIPTION - PAIN TYPE: TYPE: ACUTE PAIN

## 2023-08-11 ASSESSMENT — FIBROSIS 4 INDEX: FIB4 SCORE: 0.94

## 2023-08-11 NOTE — ED NOTES
Pt medicated per MAR. Pt resting comfortably, respirations even and unlabored. Pt given fruit juice. 1:1 sitter in direct view of pt
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
## 2023-08-11 NOTE — ED NOTES
Bedside report to Svetlana, RN  Patient resting comfortably, resp even and unlabored, NAD, and no needs at this time.  Patient continues on 1:1 observation with sitter in LOS and room checklist in place.

## 2023-08-11 NOTE — ASSESSMENT & PLAN NOTE
States she has been living in car and develeped some dependent edema  Check proBNP  Elevate legs while at rest

## 2023-08-11 NOTE — ED NOTES
"Med rec completed per patient  Allergies reviewed    Patient states she took an antibiotic for an STI 2 days ago, she states she took four tablets for one dose, she is unsure what it was, but it started with a \"T\".     Patient states she recently started a Birth Control but she is unsure what it is.     Home pharmacy, [facility], not currently open to verify.     Patient states she is supposed to be on insulin but has not had it for \"A long time\"  "

## 2023-08-11 NOTE — ED NOTES
Break RN: Pt resting in bed, chest rise and fall. No needs at this time. 1:1 sitter in direct view of patient.

## 2023-08-11 NOTE — H&P
"Hospital Medicine History & Physical Note    Date of Service  8/10/2023    Primary Care Physician  Mariana Giles M.D.    Consultants  physiatry      Code Status  Full Code    Chief Complaint  Chief Complaint   Patient presents with    Suicide Attempt       History of Presenting Illness  Martine Godinez is a 43 y.o. female who presented 8/10/2023 with suicide attempt.  Patient has medical history significant for depression.  Today patient presents after intentional overdose of Neurontin and zonisamide.  Patient was in an argument with her fiancé when she made threats to kill herself and she subsequently took \"a handful of each Neurontin and zonisamide\".  She was placed on a psych hold with one-to-one sitter.  Patient will be monitored overnight for appropriate time for clearance of medications.  Patient at risk of hypotension and if this occurs patient may need pressor support.  Discussed case with ER provider and patient will be admitted for further workup and monitoring.    I discussed the plan of care with patient and ER provider .    Review of Systems  Review of Systems   Constitutional:  Negative for chills and fever.   HENT:  Negative for congestion and sore throat.    Eyes:  Negative for blurred vision and double vision.   Respiratory:  Negative for cough and shortness of breath.    Cardiovascular:  Negative for chest pain and palpitations.   Gastrointestinal:  Negative for abdominal pain, nausea and vomiting.   Genitourinary:  Negative for dysuria and frequency.   Musculoskeletal:  Negative for back pain and falls.   Skin:  Negative for rash.   Neurological:  Negative for seizures and headaches.   Psychiatric/Behavioral:  Positive for suicidal ideas. Negative for hallucinations.        Past Medical History   has a past medical history of ADHD (attention deficit hyperactivity disorder), Cervical stenosis (uterine cervix) (4/27/2023), Dental disorder (2/2/2007), Diabetes (HCC) (6/1/2012), Heart burn " "(2004), Hypertension, Indigestion (2004), Migraine, Pneumonia (12/20/2022), PONV (postoperative nausea and vomiting) (06/12/1994), Psychiatric disorder (Bipolar), Seizure (HCC) (2021), Snoring (2012), and Urinary incontinence (1/1/2013).    Surgical History   has a past surgical history that includes tito by laparoscopy (10/04/2008); pr removal of tonsils,<11 y/o; tubal coagulation laparoscopic bilateral (09/02/2011); other abdominal surgery (10/20/2008); other (4/1/1999); and cervical disk and fusion anterior (N/A, 4/27/2023).     Family History  family history includes Alcohol abuse in her maternal grandmother and paternal aunt; Autism in her daughter and son; Diabetes in her maternal aunt, maternal aunt, maternal aunt, maternal uncle, and mother; Drug abuse in her father and mother; Hyperlipidemia in her daughter; Hypertension in her father; Psychiatric Illness in her father and mother.   Family history reviewed with patient. There is no family history that is pertinent to the chief complaint.     Social History   reports that she quit smoking about 12 years ago. Her smoking use included cigarettes and electronic cigarettes. She has a 15.00 pack-year smoking history. She has never used smokeless tobacco. She reports current drug use. Drug: Inhaled. She reports that she does not drink alcohol.    Allergies  Allergies   Allergen Reactions    Carbamazepine Rash and Itching    Morphine Vomiting    Tape Rash     Per patient, \"sensitive to tape and bandaids. Result sin contact dermatitis\"       Medications  Prior to Admission Medications   Prescriptions Last Dose Informant Patient Reported? Taking?   HYDROcodone-acetaminophen (NORCO) 5-325 MG Tab per tablet   No No   Sig: Take 1 tablet every 4-6 hours by oral route as needed for 5 days.   Insulin Glargine-yfgn 100 UNIT/ML Solution Pen-injector  Patient Yes No   Sig: Inject 8 Units under the skin at bedtime as needed.   OZEMPIC, 2 MG/DOSE, 8 MG/3ML Solution " Pen-injector  Patient Yes No   Sig: Inject 2 mg under the skin every 7 days.   amLODIPine (NORVASC) 5 MG Tab  Patient No No   Sig: Take 1 Tablet by mouth every day.   buPROPion SR (WELLBUTRIN-SR) 150 MG TABLET SR 12 HR sustained-release tablet  Patient Yes No   Sig: Take 150 mg by mouth 2 times a day.   lisinopril (PRINIVIL) 40 MG tablet  Patient Yes No   Sig: Take 40 mg by mouth every day.   methocarbamol (ROBAXIN) 750 MG Tab  Patient Yes No   Sig: Take 750 mg by mouth 3 times a day as needed.   methocarbamol (ROBAXIN) 750 MG Tab   No No   Sig: Take 1 tablet every 8 hours by oral route as directed for 14 days.      Facility-Administered Medications: None       Physical Exam  Temp:  [36.2 °C (97.1 °F)] 36.2 °C (97.1 °F)  Pulse:  [44-73] 73  Resp:  [12-22] 13  BP: ()/(51-81) 106/61  SpO2:  [89 %-98 %] 98 %  Blood Pressure: 106/61   Temperature: 36.2 °C (97.1 °F)   Pulse: 73   Respiration: 13   Pulse Oximetry: 98 %       Physical Exam  Vitals reviewed.   Constitutional:       General: She is not in acute distress.     Appearance: She is obese. She is not toxic-appearing.   HENT:      Head: Normocephalic.      Right Ear: External ear normal.      Left Ear: External ear normal.      Nose: No congestion.      Mouth/Throat:      Mouth: Mucous membranes are moist.      Pharynx: No oropharyngeal exudate.   Eyes:      General: No scleral icterus.     Pupils: Pupils are equal, round, and reactive to light.   Cardiovascular:      Rate and Rhythm: Normal rate and regular rhythm.      Heart sounds: No murmur heard.  Pulmonary:      Breath sounds: No wheezing.   Abdominal:      Palpations: Abdomen is soft.      Tenderness: There is no abdominal tenderness. There is no guarding or rebound.   Musculoskeletal:         General: No swelling or deformity.   Skin:     Coloration: Skin is not jaundiced.      Findings: No bruising.   Neurological:      General: No focal deficit present.      Mental Status: She is alert and oriented  to person, place, and time.      Motor: No weakness.   Psychiatric:         Thought Content: Thought content does not include homicidal or suicidal ideation.      Comments: Denies homicidal, suicidal ideation at this time         Laboratory:  Recent Labs     08/10/23  1759   WBC 18.3*   RBC 4.73   HEMOGLOBIN 12.9   HEMATOCRIT 39.1   MCV 82.7   MCH 27.3   MCHC 33.0   RDW 40.3   PLATELETCT 303   MPV 10.0     Recent Labs     08/10/23  1759   SODIUM 138   POTASSIUM 3.6   CHLORIDE 102   CO2 21   GLUCOSE 256*   BUN 9   CREATININE 0.88   CALCIUM 9.0     Recent Labs     08/10/23  1759   ALTSGPT 20   ASTSGOT 28   ALKPHOSPHAT 48   TBILIRUBIN 0.4   LIPASE 55   GLUCOSE 256*         No results for input(s): NTPROBNP in the last 72 hours.      No results for input(s): TROPONINT in the last 72 hours.    Imaging:  No orders to display       EKG:  I have personally reviewed the images and compared with prior images.    Assessment/Plan:  Justification for Admission Status  I anticipate this patient is appropriate for observation status at this time because Medication overdose    Patient will need a Telemetry bed on MEDICAL service .  The need is secondary to medication overdose.    * Suicide attempt (HCC)- (present on admission)  Assessment & Plan  Overdose of gabapentin and zonisamide patients states she took a 'handful' of each  Gabapentin 100mg  Psychiatry consult placed  1:1 sitter  Suicide precautions    Gabapentin overdose  Assessment & Plan  Monitor for hypotension  Pressors if needed    Type 2 diabetes mellitus (HCC)  Assessment & Plan  lantus with sliding scale  Hold home oxempic    Essential hypertension  Assessment & Plan  Hold norvasc and lisinopril   Concern for hypotension with overdose of gabapentin, zonisamide    Severe obesity (HCC)  Assessment & Plan  Due to excess caloric intake        VTE prophylaxis: SCDs/TEDs and Xarelto 10 mg daily as prophylaxis

## 2023-08-11 NOTE — ED NOTES
Poison control called wanting update on pt. This RN provided info regarding pt vital signs, EKG, and labs.  Case #[id number]

## 2023-08-11 NOTE — ED NOTES
Patient ambulated to the bathroom independently with steady gait accompanied by this RN for patient safety due to SI.  Patient assisted back to bed and given sheet, placed back on monitor, and IVF completed.  Patient resting comfortably on right side, resp even and unlabored, NAD, denies additional needs at this time.

## 2023-08-11 NOTE — ED NOTES
Patient given water, tolerated well.  Patient resting comfortably, resp even and unlabored, NAD, and no needs at this time.  Patient continues on 1:1 observation with sitter in LOS and room checklist in place.

## 2023-08-11 NOTE — ED NOTES
Patient assisted to bathroom by ED Tech.  Patient ambulated independently with steady gait.  Patient continues on 1:1 observation with sitter in LOS and room checklist in place.

## 2023-08-11 NOTE — ED NOTES
Report given to benjamin COX. Pt updated on POC. Verbalized understanding. Sitter in direct view of pt.

## 2023-08-11 NOTE — ED NOTES
Patient resting comfortably, resp even and unlabored, NAD, and no needs at this time.  Patient continues on 1:1 observation with sitter in LOS and room checklist in place.

## 2023-08-11 NOTE — ED NOTES
RN spoke with poison control.    Case number [id number]    Obtain toxicology screening and EKG.  Watch for bradycardia, treat hypotension with fluids and pressors if necessary. Call if pt deteriorates for further recommendations.

## 2023-08-11 NOTE — PROGRESS NOTES
4 Eyes Skin Assessment Completed by KENNY Alvarado and KENNY Clark.    Head WDL  Ears WDL  Nose WDL  Mouth WDL  Neck WDL  Breast/Chest WDL  Shoulder Blades WDL  Spine WDL  (R) Arm/Elbow/Hand WDL  (L) Arm/Elbow/Hand WDL  Abdomen Redness  Groin Redness  Scrotum/Coccyx/Buttocks WDL  (R) Leg WDL  (L) Leg WDL  (R) Heel/Foot/Toe WDL  (L) Heel/Foot/Toe WDL          Devices In Places Tele Box      Interventions In Place Pillows and Pressure Redistribution Mattress    Possible Skin Injury No    Pictures Uploaded Into Epic N/A  Wound Consult Placed N/A  RN Wound Prevention Protocol Ordered No     Again attempted return call  Remains unavailable per personalized answering system  [facility]

## 2023-08-11 NOTE — ED TRIAGE NOTES
"Vitals:    08/10/23 1738   BP: 110/78   Pulse: (!) 59   Resp: 18   Temp: 36.2 °C (97.1 °F)   SpO2: 98%     Chief Complaint   Patient presents with    Suicide Attempt     Pt is brought in by her fiance. Apparently they got in to an argument and her partner said \"well why don't you just kill yourself.\" Therefore pt took gabapentin 100 mg, pt reports the bottle was quite full and the bottle holds a quantity of 180 pills, and a 'handful maybe a dozen' of zonisamide 25 mg.     ERP to bedside. EKG obtained. Bilateral IVs established. Labs collected and sent.     Pt is alert and oriented x 4 but requires prompting to wake and answer questions.     1:1 sitter at bedside.    "

## 2023-08-11 NOTE — ED NOTES
Patient belongings searched by security and ONE bag placed in locker 10.  Patient has home meds at main pharmacy #[id number]  2 EMPTY pill bottles left in patient belongings - bottles are her fiance's prescription and are the pills that she took for her OD attempt.

## 2023-08-11 NOTE — ASSESSMENT & PLAN NOTE
Hold norvasc and lisinopril, concern for hypotension with overdose of gabapentin, zonisamide  Restart as appropriate.

## 2023-08-11 NOTE — ED NOTES
"Bedside report from Autumn, RN    Patient sleeping comfortably on back, resp even and unlabored, NAD, and no needs at this time.  Patient continues on 1:1 observation with sitter in LOS and room checklist in place.     IVF started.    /68   Pulse (!) 54   Temp 36.2 °C (97.1 °F) (Temporal)   Resp 16   Ht 1.626 m (5' 4\")   Wt (!) 150 kg (331 lb)   SpO2 93%    "

## 2023-08-11 NOTE — ED NOTES
Patient resting comfortably, resp even and unlabored, NAD, and no needs at this time.  Patient continues on 1:1 observation with sitter in LOS and room checklist in place.     Hospital bed ordered.

## 2023-08-11 NOTE — CARE PLAN
Problem: Knowledge Deficit - Standard  Goal: Patient and family/care givers will demonstrate understanding of plan of care, disease process/condition, diagnostic tests and medications  Outcome: Progressing  Note: Patient oriented to unit. Understands why she is here and is receptive to information regarding care and precautions     Problem: Provide Safe Environment  Goal: Suicide environmental safety, protocols, policies, and practices will be implemented  Outcome: Progressing  Flowsheets (Taken 8/11/2023 1133)  Safety Interventions: (1:1 sitter)   Patient Room Close to Nursing Station   Patient Wearing Hospital Clothing   Personal Clothing / Belongings Removed (Comment: Storage Location)   Potentially Dangerous Items Removed from room   Plastic Utensils / Paper Ware Ordered   Provided Safety Education   Discussed no self harm or elopement and safe behavior with patient   Notify Receiving Department of Close Observation Status  Note: Sitter in room, suicide risk precautions in place.      Problem: Psychosocial  Goal: Patient's ability to identify and develop effective coping behaviors will improve  Outcome: Progressing  Note: Patient expressing thoughts and feelings to Rns and sitter comfortably.    The patient is Watcher - Medium risk of patient condition declining or worsening    Shift Goals  Clinical Goals: safety, monitor vitals, tele  Patient Goals: comfort, safety  Family Goals: NIMA      Patient is not progressing towards the following goals:

## 2023-08-11 NOTE — HOSPITAL COURSE
"Martine Godinez is a 43 y.o. female who presented 8/10/2023 with suicide attempt.  Patient has medical history significant for depression.      Today patient presents after intentional overdose of Neurontin and zonisamide.  Patient was in an argument with her fiancé and subsequently took \"a handful of each Neurontin and zonisamide\".  She was placed on a psych hold with one-to-one sitter.      Patient will be monitored overnight for appropriate time for clearance of medications.  Patient at risk of hypotension and if this occurs patient may need pressor support. Patient was admitted to hospitalist service for medical management and close monitoring. Psychiatry consulted for official evaluation and further recommendations.  "

## 2023-08-11 NOTE — ED NOTES
Bedside report received from KENNY Hauser. Pt resting on gurney w/ eyes closed. Sitter in direct view of pt.

## 2023-08-11 NOTE — PROGRESS NOTES
"Hospital Medicine Daily Progress Note    Date of Service  8/11/2023    Chief Complaint  Martine Godinez is a 43 y.o. female admitted 8/10/2023 with intentional drug overdose    Hospital Course  Martine Godinez is a 43 y.o. female who presented 8/10/2023 with suicide attempt.  Patient has medical history significant for depression.      Today patient presents after intentional overdose of Neurontin and zonisamide.  Patient was in an argument with her fiancé and subsequently took \"a handful of each Neurontin and zonisamide\".  She was placed on a psych hold with one-to-one sitter.      Patient will be monitored overnight for appropriate time for clearance of medications.  Patient at risk of hypotension and if this occurs patient may need pressor support. Patient was admitted to hospitalist service for medical management and close monitoring. Psychiatry consulted for official evaluation and further recommendations.    Interval Problem Update  8/11/2023: Patient feeling physically well at this time. States she does not have suicidal ideation or intent. She is somewhat labile during interview, angry and tearful. Discussed the plan today for psychiatry evaluation while also medically monitoring for gabapentin toxicity. She does request her phone to let her partner know she is okay, did explain that unfortunately this will have to be deferred to psychiatry as part of legal hold, to which she expressed frustration but understanding.     I have discussed this patient's plan of care and discharge plan at IDT rounds today with Case Management, Nursing, Nursing leadership, and other members of the IDT team.    Consultants/Specialty  psychiatry    Code Status  Full Code    Disposition  The patient is not medically cleared for discharge to home or a post-acute facility.  Anticipate discharge to: a psychiatric hospital    I have placed the appropriate orders for post-discharge needs.    Review of Systems  Review of " Systems   Constitutional: Negative.  Negative for chills and fever.   Eyes: Negative.    Respiratory:  Negative for shortness of breath.    Cardiovascular:  Positive for leg swelling. Negative for chest pain.   Gastrointestinal:  Positive for diarrhea. Negative for nausea and vomiting.   Genitourinary:  Negative for dysuria and flank pain.   Musculoskeletal:  Negative for falls and myalgias.   Neurological:  Negative for sensory change, speech change and focal weakness.   Psychiatric/Behavioral:  Positive for depression and suicidal ideas.         Physical Exam  Temp:  [36.2 °C (97.1 °F)-37 °C (98.6 °F)] 36.8 °C (98.2 °F)  Pulse:  [44-90] 90  Resp:  [12-22] 17  BP: ()/(51-89) 131/79  SpO2:  [89 %-98 %] 93 %    Physical Exam  Vitals reviewed.   Constitutional:       General: She is not in acute distress.     Appearance: She is obese. She is not ill-appearing.   HENT:      Head: Normocephalic.      Right Ear: External ear normal.      Left Ear: External ear normal.      Mouth/Throat:      Mouth: Mucous membranes are moist.   Eyes:      Pupils: Pupils are equal, round, and reactive to light.   Cardiovascular:      Rate and Rhythm: Normal rate and regular rhythm.      Pulses: Normal pulses.      Heart sounds: Normal heart sounds.   Pulmonary:      Effort: Pulmonary effort is normal.      Breath sounds: Normal breath sounds.   Abdominal:      General: Abdomen is flat. Bowel sounds are normal.      Palpations: Abdomen is soft.      Tenderness: There is no abdominal tenderness.   Musculoskeletal:         General: No swelling or deformity.      Cervical back: Normal range of motion.      Right lower leg: Edema present.      Left lower leg: Edema present.      Comments: Trace BLE edema   Skin:     General: Skin is warm and dry.   Neurological:      General: No focal deficit present.      Mental Status: She is alert and oriented to person, place, and time.      Motor: No weakness.   Psychiatric:         Attention and  "Perception: Attention and perception normal.         Mood and Affect: Affect is angry and tearful.         Speech: Speech normal.         Behavior: Behavior is cooperative.         Thought Content: Thought content does not include homicidal or suicidal ideation.      Comments: Denies homicidal, suicidal ideation at this time         Fluids    Intake/Output Summary (Last 24 hours) at 8/11/2023 1711  Last data filed at 8/11/2023 0604  Gross per 24 hour   Intake 650 ml   Output --   Net 650 ml       Laboratory  Recent Labs     08/10/23  1759 08/11/23  0423   WBC 18.3* 7.7   RBC 4.73 4.30   HEMOGLOBIN 12.9 11.6*   HEMATOCRIT 39.1 35.8*   MCV 82.7 83.3   MCH 27.3 27.0   MCHC 33.0 32.4   RDW 40.3 41.8   PLATELETCT 303 273   MPV 10.0 9.7     Recent Labs     08/10/23  1759 08/11/23  0423   SODIUM 138 138   POTASSIUM 3.6 3.8   CHLORIDE 102 104   CO2 21 22   GLUCOSE 256* 121*   BUN 9 10   CREATININE 0.88 0.80   CALCIUM 9.0 9.1       Imaging  No orders to display        Assessment/Plan  * Suicide attempt (HCC)- (present on admission)  Assessment & Plan  Overdose of partner's gabapentin and zonisamide  States she took a 'handful' of each  Psychiatry consulted - hold extended. Anticipate eventual disposition to psychiatric hospital.  1:1 sitter  Suicide precautions    States she no longer has suicidal ideation or intent. She states that she was in heated argument with partner who told her \"you should just kill herself\", and she subsequently took the pills to upset her partner.  Very high stress related to homelessness and job insecurity over the last 6 months.     Continue wellbutrin. Add abilify 5mg per psychiatry recommendation    Leg swelling  Assessment & Plan  States she has been living in car and develeped some dependent edema  Check proBNP  Elevate legs while at rest    Type 2 diabetes mellitus (HCC)  Assessment & Plan  lantus with sliding scale  Hold home oxempic    Severe obesity (HCC)  Assessment & Plan  Due to excess " caloric intake    Gabapentin overdose  Assessment & Plan  Monitor for hypotension  BP 90s overnight, now improving 130s.     Essential hypertension  Assessment & Plan  Hold norvasc and lisinopril, concern for hypotension with overdose of gabapentin, zonisamide  Restart as appropriate.        VTE prophylaxis:   SCDs/TEDs   Xarelto 10mg daily as prophylaxis      I have performed a physical exam and reviewed and updated ROS and Plan today (8/11/2023). In review of yesterday's note (8/10/2023), there are no changes except as documented above.

## 2023-08-11 NOTE — ED NOTES
Pt's josé Patterson notified of pt condition and legal hold status.     Bedside report to KENNY Acevedo.     1:1 sitter at bedside.

## 2023-08-11 NOTE — ED NOTES
Bedside report from Melina COX. Pt resting, even chest rise and fall noted. 1:1 sitter in direct view of pt

## 2023-08-11 NOTE — CONSULTS
Alert Team:    Pt is not medically cleared and is being admitted to the floor. Psychiatry consult ordered.

## 2023-08-11 NOTE — ASSESSMENT & PLAN NOTE
"Overdose of partner's gabapentin and zonisamide  States she took a 'handful' of each  Psychiatry consulted - hold extended. Anticipate eventual disposition to psychiatric hospital.  1:1 sitter  Suicide precautions    States she no longer has suicidal ideation or intent. She states that she was in heated argument with partner who told her \"you should just kill herself\", and she subsequently took the pills to upset her partner.  Very high stress related to homelessness and job insecurity over the last 6 months.     Continue wellbutrin. Add abilify 5mg per psychiatry recommendation  "
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
## 2023-08-12 LAB
ANION GAP SERPL CALC-SCNC: 13 MMOL/L (ref 7–16)
BUN SERPL-MCNC: 5 MG/DL (ref 8–22)
CALCIUM SERPL-MCNC: 8.9 MG/DL (ref 8.5–10.5)
CHLORIDE SERPL-SCNC: 107 MMOL/L (ref 96–112)
CO2 SERPL-SCNC: 20 MMOL/L (ref 20–33)
CREAT SERPL-MCNC: 0.66 MG/DL (ref 0.5–1.4)
ERYTHROCYTE [DISTWIDTH] IN BLOOD BY AUTOMATED COUNT: 40.4 FL (ref 35.9–50)
GFR SERPLBLD CREATININE-BSD FMLA CKD-EPI: 113 ML/MIN/1.73 M 2
GLUCOSE BLD STRIP.AUTO-MCNC: 132 MG/DL (ref 65–99)
GLUCOSE BLD STRIP.AUTO-MCNC: 137 MG/DL (ref 65–99)
GLUCOSE BLD STRIP.AUTO-MCNC: 144 MG/DL (ref 65–99)
GLUCOSE BLD STRIP.AUTO-MCNC: 181 MG/DL (ref 65–99)
GLUCOSE SERPL-MCNC: 108 MG/DL (ref 65–99)
HCT VFR BLD AUTO: 42.7 % (ref 37–47)
HGB BLD-MCNC: 13.9 G/DL (ref 12–16)
MCH RBC QN AUTO: 26.7 PG (ref 27–33)
MCHC RBC AUTO-ENTMCNC: 32.6 G/DL (ref 32.2–35.5)
MCV RBC AUTO: 82.1 FL (ref 81.4–97.8)
PLATELET # BLD AUTO: 287 K/UL (ref 164–446)
PMV BLD AUTO: 9.4 FL (ref 9–12.9)
POTASSIUM SERPL-SCNC: 3.7 MMOL/L (ref 3.6–5.5)
RBC # BLD AUTO: 5.2 M/UL (ref 4.2–5.4)
SODIUM SERPL-SCNC: 140 MMOL/L (ref 135–145)
WBC # BLD AUTO: 6.8 K/UL (ref 4.8–10.8)

## 2023-08-12 PROCEDURE — 36415 COLL VENOUS BLD VENIPUNCTURE: CPT

## 2023-08-12 PROCEDURE — 700102 HCHG RX REV CODE 250 W/ 637 OVERRIDE(OP)

## 2023-08-12 PROCEDURE — 82962 GLUCOSE BLOOD TEST: CPT | Mod: 91

## 2023-08-12 PROCEDURE — 99233 SBSQ HOSP IP/OBS HIGH 50: CPT | Performed by: INTERNAL MEDICINE

## 2023-08-12 PROCEDURE — 80048 BASIC METABOLIC PNL TOTAL CA: CPT

## 2023-08-12 PROCEDURE — 700111 HCHG RX REV CODE 636 W/ 250 OVERRIDE (IP): Mod: JZ | Performed by: INTERNAL MEDICINE

## 2023-08-12 PROCEDURE — 700102 HCHG RX REV CODE 250 W/ 637 OVERRIDE(OP): Performed by: INTERNAL MEDICINE

## 2023-08-12 PROCEDURE — 85027 COMPLETE CBC AUTOMATED: CPT

## 2023-08-12 PROCEDURE — A9270 NON-COVERED ITEM OR SERVICE: HCPCS | Performed by: INTERNAL MEDICINE

## 2023-08-12 PROCEDURE — 770020 HCHG ROOM/CARE - TELE (206)

## 2023-08-12 PROCEDURE — A9270 NON-COVERED ITEM OR SERVICE: HCPCS

## 2023-08-12 RX ORDER — AMLODIPINE BESYLATE 5 MG/1
5 TABLET ORAL
Status: DISCONTINUED | OUTPATIENT
Start: 2023-08-12 | End: 2023-08-13

## 2023-08-12 RX ORDER — NYSTATIN 100000 [USP'U]/G
POWDER TOPICAL 2 TIMES DAILY
Status: DISCONTINUED | OUTPATIENT
Start: 2023-08-12 | End: 2023-08-14 | Stop reason: HOSPADM

## 2023-08-12 RX ADMIN — INSULIN GLARGINE-YFGN 10 UNITS: 100 INJECTION, SOLUTION SUBCUTANEOUS at 18:46

## 2023-08-12 RX ADMIN — ACETAMINOPHEN 650 MG: 325 TABLET, FILM COATED ORAL at 07:45

## 2023-08-12 RX ADMIN — BUPROPION HYDROCHLORIDE 150 MG: 150 TABLET, EXTENDED RELEASE ORAL at 05:54

## 2023-08-12 RX ADMIN — NORGESTREL AND ETHINYL ESTRADIOL 1 TABLET: KIT at 13:50

## 2023-08-12 RX ADMIN — AMLODIPINE BESYLATE 5 MG: 5 TABLET ORAL at 13:06

## 2023-08-12 RX ADMIN — ACETAMINOPHEN 650 MG: 325 TABLET, FILM COATED ORAL at 17:24

## 2023-08-12 RX ADMIN — ARIPIPRAZOLE 5 MG: 10 TABLET ORAL at 05:54

## 2023-08-12 RX ADMIN — BUPROPION HYDROCHLORIDE 150 MG: 150 TABLET, EXTENDED RELEASE ORAL at 17:24

## 2023-08-12 RX ADMIN — RIVAROXABAN 10 MG: 10 TABLET, FILM COATED ORAL at 17:24

## 2023-08-12 RX ADMIN — INSULIN HUMAN 1 UNITS: 100 INJECTION, SOLUTION PARENTERAL at 21:56

## 2023-08-12 RX ADMIN — ONDANSETRON 4 MG: 2 INJECTION INTRAMUSCULAR; INTRAVENOUS at 02:01

## 2023-08-12 ASSESSMENT — ENCOUNTER SYMPTOMS
SENSORY CHANGE: 0
EYES NEGATIVE: 1
FLANK PAIN: 0
VOMITING: 0
FALLS: 0
SPEECH CHANGE: 0
FEVER: 0
FOCAL WEAKNESS: 0
NAUSEA: 0
SHORTNESS OF BREATH: 0
CHILLS: 0
DIARRHEA: 1
DEPRESSION: 1
MYALGIAS: 0
CONSTITUTIONAL NEGATIVE: 1

## 2023-08-12 ASSESSMENT — FIBROSIS 4 INDEX: FIB4 SCORE: 0.89

## 2023-08-12 ASSESSMENT — PAIN DESCRIPTION - PAIN TYPE: TYPE: ACUTE PAIN

## 2023-08-12 NOTE — PROGRESS NOTES
Bedside report received from RN. Pt care assumed and assessment complete. SI legal hold precautions and sitter in place. Pt is A&O4, resting in bed, tele box in place. Pt states 3/10 pain. Bed locked in lowest position and call light within reach. Hourly monitoring initiated.

## 2023-08-12 NOTE — PROGRESS NOTES
Bedside report received by RN and patient care assumed. SI legal hold precautions are in place, sitter is with the patient. Tele Box in place, patient is A&O4, resting in bed, and on RA. Patient states 0/10 pain. Fall precautions in place and patient educated on use of call light for assistance. Pt updated on POC with no questions or concerns. Hourly monitoring initiated.

## 2023-08-12 NOTE — CONSULTS
BRIEF PSYCHIATRIC CONSULT NOTE: unable to see today but reviewed notes briefly and she is requesting partner be notified that she is here. Have modified restrictions to allow for phone.

## 2023-08-12 NOTE — PROGRESS NOTES
"Hospital Medicine Daily Progress Note    Date of Service  8/12/2023    Chief Complaint  Martine Godinez is a 43 y.o. female admitted 8/10/2023 with intentional drug overdose    Hospital Course  Martine Godinez is a 43 y.o. female who presented 8/10/2023 with suicide attempt.  Patient has medical history significant for depression.      Today patient presents after intentional overdose of Neurontin and zonisamide.  Patient was in an argument with her fiancé and subsequently took \"a handful of each Neurontin and zonisamide\".  She was placed on a psych hold with one-to-one sitter.      Patient will be monitored overnight for appropriate time for clearance of medications.  Patient at risk of hypotension and if this occurs patient may need pressor support. Patient was admitted to hospitalist service for medical management and close monitoring. Psychiatry consulted for official evaluation and further recommendations.    Interval Problem Update  8/11/2023: Patient feeling physically well at this time. States she does not have suicidal ideation or intent. She is somewhat labile during interview, angry and tearful. Discussed the plan today for psychiatry evaluation while also medically monitoring for gabapentin toxicity. She does request her phone to let her partner know she is okay, did explain that unfortunately this will have to be deferred to psychiatry as part of legal hold, to which she expressed frustration but understanding.   8/12: Patient seen and  examined  afebrile, resting in bed, on legal hold psychiatry following appreciate rec.   Patient states that takes birth controls at home will restart here  Also BP not well controlled will start amlodipine     I have discussed this patient's plan of care and discharge plan at IDT rounds today with Case Management, Nursing, Nursing leadership, and other members of the IDT team.    Consultants/Specialty  psychiatry    Code Status  Full Code    Disposition  The " patient is not medically cleared for discharge to home or a post-acute facility.    I have placed the appropriate orders for post-discharge needs.    Review of Systems  Review of Systems   Constitutional: Negative.  Negative for chills and fever.   Eyes: Negative.    Respiratory:  Negative for shortness of breath.    Cardiovascular:  Positive for leg swelling. Negative for chest pain.   Gastrointestinal:  Positive for diarrhea. Negative for nausea and vomiting.   Genitourinary:  Negative for dysuria and flank pain.   Musculoskeletal:  Negative for falls and myalgias.   Neurological:  Negative for sensory change, speech change and focal weakness.   Psychiatric/Behavioral:  Positive for depression and suicidal ideas.         Physical Exam  Temp:  [36.2 °C (97.2 °F)-36.9 °C (98.4 °F)] 36.9 °C (98.4 °F)  Pulse:  [] 111  Resp:  [17-20] 18  BP: (131-154)/() 149/108  SpO2:  [93 %-98 %] 95 %    Physical Exam  Vitals reviewed.   Constitutional:       General: She is not in acute distress.     Appearance: She is obese. She is not ill-appearing.   HENT:      Head: Normocephalic.      Right Ear: External ear normal.      Left Ear: External ear normal.      Mouth/Throat:      Mouth: Mucous membranes are moist.   Eyes:      Pupils: Pupils are equal, round, and reactive to light.   Cardiovascular:      Rate and Rhythm: Normal rate and regular rhythm.      Pulses: Normal pulses.      Heart sounds: Normal heart sounds.   Pulmonary:      Effort: Pulmonary effort is normal.      Breath sounds: Normal breath sounds.   Abdominal:      General: Abdomen is flat. Bowel sounds are normal.      Palpations: Abdomen is soft.      Tenderness: There is no abdominal tenderness.   Musculoskeletal:         General: No swelling or deformity.      Cervical back: Normal range of motion.      Right lower leg: Edema present.      Left lower leg: Edema present.      Comments: Trace BLE edema   Skin:     General: Skin is warm and dry.  "  Neurological:      General: No focal deficit present.      Mental Status: She is alert and oriented to person, place, and time.      Motor: No weakness.   Psychiatric:         Attention and Perception: Attention and perception normal.         Mood and Affect: Affect is angry and tearful.         Speech: Speech normal.         Behavior: Behavior is cooperative.         Thought Content: Thought content does not include homicidal or suicidal ideation.      Comments: Denies homicidal, suicidal ideation at this time       Fluids    Intake/Output Summary (Last 24 hours) at 8/12/2023 1121  Last data filed at 8/12/2023 0415  Gross per 24 hour   Intake 480 ml   Output 0 ml   Net 480 ml         Laboratory  Recent Labs     08/10/23  1759 08/11/23  0423 08/12/23  0032   WBC 18.3* 7.7 6.8   RBC 4.73 4.30 5.20   HEMOGLOBIN 12.9 11.6* 13.9   HEMATOCRIT 39.1 35.8* 42.7   MCV 82.7 83.3 82.1   MCH 27.3 27.0 26.7*   MCHC 33.0 32.4 32.6   RDW 40.3 41.8 40.4   PLATELETCT 303 273 287   MPV 10.0 9.7 9.4       Recent Labs     08/10/23  1759 08/11/23  0423 08/12/23  0032   SODIUM 138 138 140   POTASSIUM 3.6 3.8 3.7   CHLORIDE 102 104 107   CO2 21 22 20   GLUCOSE 256* 121* 108*   BUN 9 10 5*   CREATININE 0.88 0.80 0.66   CALCIUM 9.0 9.1 8.9         Imaging  No orders to display          Assessment/Plan  * Suicide attempt (HCC)- (present on admission)  Assessment & Plan  Overdose of partner's gabapentin and zonisamide  States she took a 'handful' of each  Psychiatry consulted - hold extended. Anticipate eventual disposition to psychiatric hospital.  1:1 sitter  Suicide precautions    States she no longer has suicidal ideation or intent. She states that she was in heated argument with partner who told her \"you should just kill herself\", and she subsequently took the pills to upset her partner.  Very high stress related to homelessness and job insecurity over the last 6 months.     Continue wellbutrin. Add abilify 5mg per psychiatry " recommendation    Leg swelling  Assessment & Plan  States she has been living in car and develeped some dependent edema  Check proBNP  Elevate legs while at rest    Type 2 diabetes mellitus (HCC)  Assessment & Plan  lantus with sliding scale  Hold home oxempic    Severe obesity (HCC)  Assessment & Plan  Due to excess caloric intake    Gabapentin overdose  Assessment & Plan  Monitor for hypotension  BP 90s overnight, now improving 130s.     Essential hypertension  Assessment & Plan  Hold norvasc and lisinopril, concern for hypotension with overdose of gabapentin, zonisamide  Restart as appropriate.      Greater than 51 minutes spent prepping to see patient (e.g. review of tests) obtaining and/or reviewing separately obtained history. Performing a medically appropriate examination and/ evaluation.  Counseling and educating the patient/family/caregiver.  Ordering medications, tests, or procedures.  Referring and communicating with other health care professionals.  Documenting clinical information in EPIC.  Independently interpreting results and communicating results to patient/family/caregiver.  Care coordination.      VTE prophylaxis:    Xarelto 10mg daily as prophylaxis      I have performed a physical exam and reviewed and updated ROS and Plan today (8/12/2023). In review of yesterday's note (8/11/2023), there are no changes except as documented above.

## 2023-08-12 NOTE — CARE PLAN
The patient is Stable - Low risk of patient condition declining or worsening    Shift Goals  Clinical Goals: safety, monitor tele  Patient Goals: comfort  Family Goals: NIMA    Progress made toward(s) clinical / shift goals:      Problem: Knowledge Deficit - Standard  Goal: Patient and family/care givers will demonstrate understanding of plan of care, disease process/condition, diagnostic tests and medications  8/12/2023 1529 by Crystal Beltre R.N.  Outcome: Progressing    Problem: Provide Safe Environment  Goal: Suicide environmental safety, protocols, policies, and practices will be implemented  8/12/2023 1529 by Crystal Beltre R.N.  Outcome: Progressing     Problem: Psychosocial  Goal: Patient's ability to identify and develop effective coping behaviors will improve  8/12/2023 1529 by Crystal Beltre R.N.  Outcome: Progressing    Goal: Patient's ability to identify and utilize available support systems will improve  Outcome: Progressing     Problem: Pain - Standard  Goal: Alleviation of pain or a reduction in pain to the patient’s comfort goal  Outcome: Progressing

## 2023-08-13 LAB
ANION GAP SERPL CALC-SCNC: 12 MMOL/L (ref 7–16)
BUN SERPL-MCNC: 6 MG/DL (ref 8–22)
CALCIUM SERPL-MCNC: 9.1 MG/DL (ref 8.5–10.5)
CHLORIDE SERPL-SCNC: 102 MMOL/L (ref 96–112)
CO2 SERPL-SCNC: 20 MMOL/L (ref 20–33)
CREAT SERPL-MCNC: 0.74 MG/DL (ref 0.5–1.4)
ERYTHROCYTE [DISTWIDTH] IN BLOOD BY AUTOMATED COUNT: 38.7 FL (ref 35.9–50)
GFR SERPLBLD CREATININE-BSD FMLA CKD-EPI: 104 ML/MIN/1.73 M 2
GLUCOSE BLD STRIP.AUTO-MCNC: 152 MG/DL (ref 65–99)
GLUCOSE BLD STRIP.AUTO-MCNC: 169 MG/DL (ref 65–99)
GLUCOSE BLD STRIP.AUTO-MCNC: 200 MG/DL (ref 65–99)
GLUCOSE BLD STRIP.AUTO-MCNC: 99 MG/DL (ref 65–99)
GLUCOSE SERPL-MCNC: 162 MG/DL (ref 65–99)
HCT VFR BLD AUTO: 43 % (ref 37–47)
HGB BLD-MCNC: 14.2 G/DL (ref 12–16)
MCH RBC QN AUTO: 26.9 PG (ref 27–33)
MCHC RBC AUTO-ENTMCNC: 33 G/DL (ref 32.2–35.5)
MCV RBC AUTO: 81.4 FL (ref 81.4–97.8)
PLATELET # BLD AUTO: 261 K/UL (ref 164–446)
PMV BLD AUTO: 10.1 FL (ref 9–12.9)
POTASSIUM SERPL-SCNC: 3.4 MMOL/L (ref 3.6–5.5)
RBC # BLD AUTO: 5.28 M/UL (ref 4.2–5.4)
SODIUM SERPL-SCNC: 134 MMOL/L (ref 135–145)
WBC # BLD AUTO: 8.5 K/UL (ref 4.8–10.8)

## 2023-08-13 PROCEDURE — 85027 COMPLETE CBC AUTOMATED: CPT

## 2023-08-13 PROCEDURE — A9270 NON-COVERED ITEM OR SERVICE: HCPCS

## 2023-08-13 PROCEDURE — 700102 HCHG RX REV CODE 250 W/ 637 OVERRIDE(OP): Performed by: INTERNAL MEDICINE

## 2023-08-13 PROCEDURE — 700102 HCHG RX REV CODE 250 W/ 637 OVERRIDE(OP)

## 2023-08-13 PROCEDURE — 82962 GLUCOSE BLOOD TEST: CPT

## 2023-08-13 PROCEDURE — 770020 HCHG ROOM/CARE - TELE (206)

## 2023-08-13 PROCEDURE — A9270 NON-COVERED ITEM OR SERVICE: HCPCS | Performed by: INTERNAL MEDICINE

## 2023-08-13 PROCEDURE — 99233 SBSQ HOSP IP/OBS HIGH 50: CPT | Performed by: INTERNAL MEDICINE

## 2023-08-13 PROCEDURE — 80048 BASIC METABOLIC PNL TOTAL CA: CPT

## 2023-08-13 PROCEDURE — 700111 HCHG RX REV CODE 636 W/ 250 OVERRIDE (IP): Performed by: INTERNAL MEDICINE

## 2023-08-13 RX ORDER — AMLODIPINE BESYLATE 5 MG/1
5 TABLET ORAL ONCE
Status: COMPLETED | OUTPATIENT
Start: 2023-08-13 | End: 2023-08-13

## 2023-08-13 RX ORDER — POTASSIUM CHLORIDE 20 MEQ/1
40 TABLET, EXTENDED RELEASE ORAL ONCE
Status: COMPLETED | OUTPATIENT
Start: 2023-08-13 | End: 2023-08-13

## 2023-08-13 RX ORDER — AMLODIPINE BESYLATE 10 MG/1
10 TABLET ORAL DAILY
Status: DISCONTINUED | OUTPATIENT
Start: 2023-08-13 | End: 2023-08-13

## 2023-08-13 RX ORDER — METOPROLOL SUCCINATE 50 MG/1
50 TABLET, EXTENDED RELEASE ORAL DAILY
Status: DISCONTINUED | OUTPATIENT
Start: 2023-08-13 | End: 2023-08-14 | Stop reason: HOSPADM

## 2023-08-13 RX ORDER — LISINOPRIL 20 MG/1
40 TABLET ORAL DAILY
Status: DISCONTINUED | OUTPATIENT
Start: 2023-08-13 | End: 2023-08-14 | Stop reason: HOSPADM

## 2023-08-13 RX ORDER — AMLODIPINE BESYLATE 10 MG/1
10 TABLET ORAL DAILY
Status: DISCONTINUED | OUTPATIENT
Start: 2023-08-14 | End: 2023-08-14 | Stop reason: HOSPADM

## 2023-08-13 RX ADMIN — NORGESTREL AND ETHINYL ESTRADIOL 1 TABLET: KIT at 04:30

## 2023-08-13 RX ADMIN — AMLODIPINE BESYLATE 5 MG: 5 TABLET ORAL at 04:30

## 2023-08-13 RX ADMIN — INSULIN HUMAN 1 UNITS: 100 INJECTION, SOLUTION PARENTERAL at 11:18

## 2023-08-13 RX ADMIN — METOPROLOL SUCCINATE 50 MG: 50 TABLET, EXTENDED RELEASE ORAL at 09:58

## 2023-08-13 RX ADMIN — ARIPIPRAZOLE 5 MG: 10 TABLET ORAL at 04:30

## 2023-08-13 RX ADMIN — BUPROPION HYDROCHLORIDE 150 MG: 150 TABLET, EXTENDED RELEASE ORAL at 16:48

## 2023-08-13 RX ADMIN — INSULIN HUMAN 1 UNITS: 100 INJECTION, SOLUTION PARENTERAL at 07:33

## 2023-08-13 RX ADMIN — POTASSIUM CHLORIDE 40 MEQ: 1500 TABLET, EXTENDED RELEASE ORAL at 13:07

## 2023-08-13 RX ADMIN — RIVAROXABAN 10 MG: 10 TABLET, FILM COATED ORAL at 16:48

## 2023-08-13 RX ADMIN — LISINOPRIL 40 MG: 20 TABLET ORAL at 09:56

## 2023-08-13 RX ADMIN — BUPROPION HYDROCHLORIDE 150 MG: 150 TABLET, EXTENDED RELEASE ORAL at 04:30

## 2023-08-13 RX ADMIN — ACETAMINOPHEN 650 MG: 325 TABLET, FILM COATED ORAL at 16:48

## 2023-08-13 RX ADMIN — ONDANSETRON 4 MG: 4 TABLET, ORALLY DISINTEGRATING ORAL at 07:29

## 2023-08-13 RX ADMIN — LOPERAMIDE HYDROCHLORIDE 2 MG: 2 CAPSULE ORAL at 21:42

## 2023-08-13 RX ADMIN — NYSTATIN: 100000 POWDER TOPICAL at 16:48

## 2023-08-13 RX ADMIN — ACETAMINOPHEN 650 MG: 325 TABLET, FILM COATED ORAL at 04:30

## 2023-08-13 RX ADMIN — LOPERAMIDE HYDROCHLORIDE 2 MG: 2 CAPSULE ORAL at 16:48

## 2023-08-13 RX ADMIN — NYSTATIN: 100000 POWDER TOPICAL at 04:31

## 2023-08-13 RX ADMIN — AMLODIPINE BESYLATE 5 MG: 5 TABLET ORAL at 09:56

## 2023-08-13 RX ADMIN — INSULIN GLARGINE-YFGN 10 UNITS: 100 INJECTION, SOLUTION SUBCUTANEOUS at 16:52

## 2023-08-13 ASSESSMENT — ENCOUNTER SYMPTOMS
FOCAL WEAKNESS: 0
SHORTNESS OF BREATH: 0
SPEECH CHANGE: 0
CHILLS: 0
DIARRHEA: 1
VOMITING: 0
FALLS: 0
MYALGIAS: 0
DEPRESSION: 1
EYES NEGATIVE: 1
NAUSEA: 0
SENSORY CHANGE: 0
FEVER: 0
CONSTITUTIONAL NEGATIVE: 1
FLANK PAIN: 0

## 2023-08-13 ASSESSMENT — FIBROSIS 4 INDEX: FIB4 SCORE: 0.98

## 2023-08-13 ASSESSMENT — PAIN DESCRIPTION - PAIN TYPE: TYPE: ACUTE PAIN

## 2023-08-13 NOTE — PROGRESS NOTES
"Hospital Medicine Daily Progress Note    Date of Service  8/13/2023    Chief Complaint  Martine Godinez is a 43 y.o. female admitted 8/10/2023 with intentional drug overdose    Hospital Course  Martine Godinez is a 43 y.o. female who presented 8/10/2023 with suicide attempt.  Patient has medical history significant for depression.      Today patient presents after intentional overdose of Neurontin and zonisamide.  Patient was in an argument with her fiancé and subsequently took \"a handful of each Neurontin and zonisamide\".  She was placed on a psych hold with one-to-one sitter.      Patient will be monitored overnight for appropriate time for clearance of medications.  Patient at risk of hypotension and if this occurs patient may need pressor support. Patient was admitted to hospitalist service for medical management and close monitoring. Psychiatry consulted for official evaluation and further recommendations.    Interval Problem Update  8/11/2023: Patient feeling physically well at this time. States she does not have suicidal ideation or intent. She is somewhat labile during interview, angry and tearful. Discussed the plan today for psychiatry evaluation while also medically monitoring for gabapentin toxicity. She does request her phone to let her partner know she is okay, did explain that unfortunately this will have to be deferred to psychiatry as part of legal hold, to which she expressed frustration but understanding.   8/12: Patient seen and  examined  afebrile, resting in bed, on legal hold psychiatry following appreciate rec.   Patient states that takes birth controls at home will restart here  Also BP not well controlled will start amlodipine   8/13: Patient resting in bed, afebrile, her BP is not controlled /113 increasing amlodipine to 10 mg also starting her outpatient regimen of lisinopril and metoprolol  Psychiatry also following, case discussed again today with Dr. Vini ingram " rec.     I have discussed this patient's plan of care and discharge plan at IDT rounds today with Case Management, Nursing, Nursing leadership, and other members of the IDT team.    Consultants/Specialty  psychiatry    Code Status  Full Code    Disposition  The patient is not medically cleared for discharge to home or a post-acute facility.      I have placed the appropriate orders for post-discharge needs.    Review of Systems  Review of Systems   Constitutional: Negative.  Negative for chills and fever.   Eyes: Negative.    Respiratory:  Negative for shortness of breath.    Cardiovascular:  Positive for leg swelling. Negative for chest pain.   Gastrointestinal:  Positive for diarrhea. Negative for nausea and vomiting.   Genitourinary:  Negative for dysuria and flank pain.   Musculoskeletal:  Negative for falls and myalgias.   Neurological:  Negative for sensory change, speech change and focal weakness.   Psychiatric/Behavioral:  Positive for depression and suicidal ideas.         Physical Exam  Temp:  [36.4 °C (97.5 °F)-36.8 °C (98.2 °F)] 36.7 °C (98.1 °F)  Pulse:  [81-96] 85  Resp:  [16-20] 18  BP: (152-178)/() 152/86  SpO2:  [94 %-96 %] 95 %    Physical Exam  Vitals reviewed.   Constitutional:       General: She is not in acute distress.     Appearance: She is obese. She is not ill-appearing.   HENT:      Head: Normocephalic.      Right Ear: External ear normal.      Left Ear: External ear normal.      Mouth/Throat:      Mouth: Mucous membranes are moist.   Eyes:      Pupils: Pupils are equal, round, and reactive to light.   Cardiovascular:      Rate and Rhythm: Normal rate and regular rhythm.      Pulses: Normal pulses.      Heart sounds: Normal heart sounds.   Pulmonary:      Effort: Pulmonary effort is normal.      Breath sounds: Normal breath sounds.   Abdominal:      General: Abdomen is flat. Bowel sounds are normal.      Palpations: Abdomen is soft.      Tenderness: There is no abdominal tenderness.    Musculoskeletal:         General: No swelling or deformity.      Cervical back: Normal range of motion.      Right lower leg: Edema present.      Left lower leg: Edema present.      Comments: Trace BLE edema   Skin:     General: Skin is warm and dry.   Neurological:      General: No focal deficit present.      Mental Status: She is alert and oriented to person, place, and time.      Motor: No weakness.   Psychiatric:         Attention and Perception: Attention and perception normal.         Mood and Affect: Affect is angry and tearful.         Speech: Speech normal.         Behavior: Behavior is cooperative.         Thought Content: Thought content does not include homicidal or suicidal ideation.      Comments: Denies homicidal, suicidal ideation at this time         Fluids    Intake/Output Summary (Last 24 hours) at 8/13/2023 1206  Last data filed at 8/12/2023 2300  Gross per 24 hour   Intake 720 ml   Output 0 ml   Net 720 ml         Laboratory  Recent Labs     08/11/23  0423 08/12/23  0032 08/13/23  0015   WBC 7.7 6.8 8.5   RBC 4.30 5.20 5.28   HEMOGLOBIN 11.6* 13.9 14.2   HEMATOCRIT 35.8* 42.7 43.0   MCV 83.3 82.1 81.4   MCH 27.0 26.7* 26.9*   MCHC 32.4 32.6 33.0   RDW 41.8 40.4 38.7   PLATELETCT 273 287 261   MPV 9.7 9.4 10.1       Recent Labs     08/11/23 0423 08/12/23  0032 08/13/23  0015   SODIUM 138 140 134*   POTASSIUM 3.8 3.7 3.4*   CHLORIDE 104 107 102   CO2 22 20 20   GLUCOSE 121* 108* 162*   BUN 10 5* 6*   CREATININE 0.80 0.66 0.74   CALCIUM 9.1 8.9 9.1         Imaging  No orders to display          Assessment/Plan  * Suicide attempt (HCC)- (present on admission)  Assessment & Plan  Overdose of partner's gabapentin and zonisamide  States she took a 'handful' of each  Psychiatry consulted - hold extended. Anticipate eventual disposition to psychiatric hospital.  1:1 sitter  Suicide precautions    States she no longer has suicidal ideation or intent. She states that she was in heated argument with  "partner who told her \"you should just kill herself\", and she subsequently took the pills to upset her partner.  Very high stress related to homelessness and job insecurity over the last 6 months.     Continue wellbutrin. Add abilify 5mg per psychiatry recommendation    Leg swelling  Assessment & Plan  States she has been living in car and develeped some dependent edema  Check proBNP  Elevate legs while at rest    Type 2 diabetes mellitus (HCC)  Assessment & Plan  lantus with sliding scale  Hold home oxempic    Severe obesity (HCC)  Assessment & Plan  Due to excess caloric intake    Gabapentin overdose  Assessment & Plan  Monitor for hypotension  BP 90s overnight, now improving 130s.     Essential hypertension  Assessment & Plan  Hold norvasc and lisinopril, concern for hypotension with overdose of gabapentin, zonisamide  Restart as appropriate.        Greater than 51 minutes spent prepping to see patient (e.g. review of tests) obtaining and/or reviewing separately obtained history. Performing a medically appropriate examination and/ evaluation.  Counseling and educating the patient/family/caregiver.  Ordering medications, tests, or procedures.  Referring and communicating with other health care professionals.  Documenting clinical information in EPIC.  Independently interpreting results and communicating results to patient/family/caregiver.  Care coordination.      VTE prophylaxis:    Xarelto 10mg daily as prophylaxis      I have performed a physical exam and reviewed and updated ROS and Plan today (8/13/2023). In review of yesterday's note (8/12/2023), there are no changes except as documented above.        "

## 2023-08-13 NOTE — DIETARY
NUTRITION SERVICES: BMI - Pt with BMI >40 (=Body mass index is 53.47 kg/m².), Class III obesity. Weight loss counseling not appropriate in acute care setting. RECOMMEND - If appropriate at DC please refer to outpatient nutrition services for weight management.

## 2023-08-13 NOTE — CONSULTS
"  [facility] PSYCHIATRIC FOLLOW-UP:(established)  *Reason for admission:  SA with Neurontin and Zonisamide overdose  *Legal Hold Status: on legal hold                S: she has slept and spoken with her significant other \"they\". They are both willing to go to therapy together. Pt has had DBT and CBT  as well. Pt analyzed (with they as well) what happened: both are dx as borderline personality disorder, both lost their jobs and are very likely to lose the apt, both have been very stressed and they have now agreed that Pt will not try to kill herself again and they will not shove the pt. They will also respect when one or the other ask for space.      O: Medical ROS (as pertinent):                      *Psychiatric Examination:   Vitals:   Vitals:    08/12/23 2051 08/12/23 2339 08/13/23 0418 08/13/23 0749   BP: (!) 168/107 (!) 155/87 (!) 173/116 (!) 178/113   Pulse: 91 81 94 96   Resp: 18 16 20 18   Temp: 36.4 °C (97.5 °F) 36.7 °C (98.1 °F) 36.7 °C (98.1 °F) 36.8 °C (98.2 °F)   TempSrc: Temporal Temporal Temporal Temporal   SpO2: 96% 94% 96% 95%   Weight: (!) 141 kg (311 lb 8.2 oz)      Height:         General Appearance:  good eye contact and cooperative  Abnormal Movements: none   Gait and Posture: not observed  Speech: within normal limits  Thought Process: normal rate  Associations:   linear and logical  Abnormal or Psychotic Thoughts: none  Judgement and Insight: improved  Orientation: grossly intact  Recent and Remote Memory: grossly intact  Attention Span and Concentration: intact  Language:fluent  Fund of Knowledge: not tested  Mood and Affect: euthymic  SI/HI:  suicidal - no and homicidal - no       Current Medications:  Scheduled Medications   Medication Dose Frequency    metoprolol SR  50 mg DAILY    lisinopril  40 mg DAILY    [START ON 8/14/2023] amLODIPine  10 mg DAILY    norgestrel-ethinyl estradiol  1 Tablet DAILY    nystatin   BID    ARIPiprazole  5 mg DAILY    buPROPion SR  150 mg BID "    insulin GLARGINE  10 Units Q EVENING    senna-docusate  2 Tablet BID    rivaroxaban  10 mg DAILY AT 1800    insulin regular  1-6 Units 4X/DAY ACHS          *ASSESSMENT/RECOMENDATIONS:  1.  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 Scripts for 1 week for wellbutrin ir 150 mg bid  2  script for abilify 5 mg/d x 1 week  3  SW to give referrals for therapists for marital counseling, individual, etc.on release  4  They will pick her up when she is medically cleared, probably tomorrow per tx tm    Signing off. Please reconsult as needed.  Discharge recommendations: per tx tm    On DC please provide crisis line, 911 etc

## 2023-08-13 NOTE — PROGRESS NOTES
Bedside report received from RN. Patient care assumed and assessment complete. Pt is A&O4, tele monitor on, and 0/10 pain. No signs of acute distress noticed at this time. Pt denies any additional needs at this time. Bed locked/lowest position, call light within reach, hourly rounding initiated.

## 2023-08-13 NOTE — PROGRESS NOTES
Received bedside report from RN, pt care assumed, VSS, pt assessment complete. Pt AAOx4, tele monitor on. No signs of acute distress noted at this time. Plan of care discussed with pt and verbalizes no questions. Pt denies any additional needs at this time. Bed locked/in lowest position, pt educated on fall risk and verbalized understanding, call light within reach, hourly rounding initiated. Sitter at bedside

## 2023-08-13 NOTE — CARE PLAN
The patient is Stable - Low risk of patient condition declining or worsening    Shift Goals  Clinical Goals: safety, monitor labs/vitals  Patient Goals: sleep  Family Goals: no family present    Progress made toward(s) clinical / shift goals:      Problem: Knowledge Deficit - Standard  Goal: Patient and family/care givers will demonstrate understanding of plan of care, disease process/condition, diagnostic tests and medications  Outcome: Progressing     Problem: Provide Safe Environment  Goal: Suicide environmental safety, protocols, policies, and practices will be implemented  Outcome: Progressing     Problem: Psychosocial  Goal: Patient's ability to identify and develop effective coping behaviors will improve  Outcome: Progressing       Patient understands condition, legal hold status, treatment plan, and medications being administered. STOP sign filled out with sitter before shift to ensure safe environment was being met. Patient expressed coping triggers and coping behaviors during long discussion about reason for admission. Patient denies any current suicidal ideations. All vitals and labs being closely monitored, stable at this time. 1:1 sitter at bedside

## 2023-08-14 ENCOUNTER — PHARMACY VISIT (OUTPATIENT)
Dept: PHARMACY | Facility: MEDICAL CENTER | Age: 43
End: 2023-08-14
Payer: COMMERCIAL

## 2023-08-14 VITALS
WEIGHT: 293 LBS | BODY MASS INDEX: 50.02 KG/M2 | SYSTOLIC BLOOD PRESSURE: 135 MMHG | HEIGHT: 64 IN | OXYGEN SATURATION: 96 % | RESPIRATION RATE: 18 BRPM | DIASTOLIC BLOOD PRESSURE: 83 MMHG | TEMPERATURE: 97.3 F | HEART RATE: 85 BPM

## 2023-08-14 LAB
ANION GAP SERPL CALC-SCNC: 13 MMOL/L (ref 7–16)
BUN SERPL-MCNC: 9 MG/DL (ref 8–22)
CALCIUM SERPL-MCNC: 9.7 MG/DL (ref 8.5–10.5)
CHLORIDE SERPL-SCNC: 104 MMOL/L (ref 96–112)
CO2 SERPL-SCNC: 20 MMOL/L (ref 20–33)
CREAT SERPL-MCNC: 0.75 MG/DL (ref 0.5–1.4)
GFR SERPLBLD CREATININE-BSD FMLA CKD-EPI: 102 ML/MIN/1.73 M 2
GLUCOSE BLD STRIP.AUTO-MCNC: 122 MG/DL (ref 65–99)
GLUCOSE SERPL-MCNC: 119 MG/DL (ref 65–99)
POTASSIUM SERPL-SCNC: 3.8 MMOL/L (ref 3.6–5.5)
SODIUM SERPL-SCNC: 137 MMOL/L (ref 135–145)

## 2023-08-14 PROCEDURE — 99239 HOSP IP/OBS DSCHRG MGMT >30: CPT | Performed by: INTERNAL MEDICINE

## 2023-08-14 PROCEDURE — A9270 NON-COVERED ITEM OR SERVICE: HCPCS | Performed by: INTERNAL MEDICINE

## 2023-08-14 PROCEDURE — A9270 NON-COVERED ITEM OR SERVICE: HCPCS

## 2023-08-14 PROCEDURE — 700102 HCHG RX REV CODE 250 W/ 637 OVERRIDE(OP): Performed by: INTERNAL MEDICINE

## 2023-08-14 PROCEDURE — 82962 GLUCOSE BLOOD TEST: CPT

## 2023-08-14 PROCEDURE — 700102 HCHG RX REV CODE 250 W/ 637 OVERRIDE(OP)

## 2023-08-14 PROCEDURE — 80048 BASIC METABOLIC PNL TOTAL CA: CPT

## 2023-08-14 PROCEDURE — RXMED WILLOW AMBULATORY MEDICATION CHARGE: Performed by: INTERNAL MEDICINE

## 2023-08-14 RX ORDER — INSULIN GLARGINE 100 [IU]/ML
8 INJECTION, SOLUTION SUBCUTANEOUS EVERY EVENING
Qty: 3 ML | Refills: 0 | Status: ACTIVE | OUTPATIENT
Start: 2023-08-14 | End: 2023-09-13

## 2023-08-14 RX ORDER — BUPROPION HYDROCHLORIDE 150 MG/1
150 TABLET, EXTENDED RELEASE ORAL 2 TIMES DAILY
Qty: 14 TABLET | Refills: 0 | Status: SHIPPED | OUTPATIENT
Start: 2023-08-14 | End: 2023-08-21

## 2023-08-14 RX ORDER — ARIPIPRAZOLE 5 MG/1
5 TABLET ORAL DAILY
Qty: 7 TABLET | Refills: 0 | Status: SHIPPED | OUTPATIENT
Start: 2023-08-15 | End: 2023-08-22

## 2023-08-14 RX ADMIN — BUPROPION HYDROCHLORIDE 150 MG: 150 TABLET, EXTENDED RELEASE ORAL at 04:34

## 2023-08-14 RX ADMIN — LISINOPRIL 40 MG: 20 TABLET ORAL at 04:34

## 2023-08-14 RX ADMIN — ARIPIPRAZOLE 5 MG: 10 TABLET ORAL at 04:34

## 2023-08-14 RX ADMIN — NYSTATIN: 100000 POWDER TOPICAL at 04:42

## 2023-08-14 RX ADMIN — NORGESTREL AND ETHINYL ESTRADIOL 1 TABLET: KIT at 04:41

## 2023-08-14 RX ADMIN — LOPERAMIDE HYDROCHLORIDE 2 MG: 2 CAPSULE ORAL at 04:35

## 2023-08-14 RX ADMIN — AMLODIPINE BESYLATE 10 MG: 10 TABLET ORAL at 05:54

## 2023-08-14 NOTE — PROGRESS NOTES
Received bedside report from day shift RN and assumed care.  Patient sitting up in bed and is alert and comfortable.  Denies any need at this time.  Plan of care was discussed and all questions were answered.  Continue with plan of care.

## 2023-08-14 NOTE — DISCHARGE SUMMARY
"Discharge Summary    CHIEF COMPLAINT ON ADMISSION  Chief Complaint   Patient presents with    Suicide Attempt       Reason for Admission  SI     Admission Date  8/10/2023    CODE STATUS  Prior    HPI & HOSPITAL COURSE  This is a 43 y.o. female  who presented 8/10/2023 with suicide attempt.  Patient has medical history significant for depression.    Patient was in an argument with her fiancé and subsequently took \"a handful of each Neurontin and zonisamide\".  She was placed on a legal hold with one-to-one sitter and psychiatry was consulted  Patient was monitored  for appropriate time for clearance of medications. Initially her BP meds were held as she in risk of getting hypotenisve but once her BP started trending up they were restarted.  She was seen by psychiatry her symptoms improved and her legal hold has been discontinued   She will be discharged home today     The patient met 2-midnight criteria for an inpatient stay at the time of discharge.    Discharge Date  8/14/2023    FOLLOW UP ITEMS POST DISCHARGE  Psychiatry     DISCHARGE DIAGNOSES  Principal Problem:    Suicide attempt (HCC) (POA: Yes)  Active Problems:    Essential hypertension (POA: Unknown)    Gabapentin overdose (POA: Unknown)    Severe obesity (HCC) (POA: Unknown)    Type 2 diabetes mellitus (HCC) (POA: Unknown)    Leg swelling (POA: Unknown)  Resolved Problems:    * No resolved hospital problems. *      FOLLOW UP  No future appointments.  Mariana Giles M.D.  [address] [facility]  [address]  [facility] [address]  [phone]    Schedule an appointment as soon as possible for a visit in 1 week(s)        MEDICATIONS ON DISCHARGE     Medication List        START taking these medications        Instructions   ARIPiprazole 5 MG tablet  Start taking on: August 15, 2023  Commonly known as: Abilify   Take 1 Tablet by mouth every day for 7 days.  Dose: 5 mg     BD Pen Needle Jody U/F  Generic drug: Insulin Pen Needle 32 G x 4 mm   Use to inject insulin as " "directed     insulin glargine 100 UNIT/ML Sopn injection  Generic drug: insulin glargine  Replaces: Insulin Glargine-yfgn 100 UNIT/ML Sopn   Inject 8 Units under the skin every evening for 30 days.  Dose: 8 Units            CONTINUE taking these medications        Instructions   amLODIPine 10 MG Tabs  Commonly known as: Norvasc   Take 10 mg by mouth every day.  Dose: 10 mg     buPROPion  MG Tb12 sustained-release tablet  Commonly known as: Wellbutrin-SR   Take 1 Tablet by mouth 2 times a day for 7 days.  Dose: 150 mg     lisinopril 40 MG tablet  Commonly known as: Prinivil   Take 40 mg by mouth every day.  Dose: 40 mg     metoprolol SR 50 MG Tb24  Commonly known as: Toprol XL   Take 50 mg by mouth every day.  Dose: 50 mg     Non Formulary Request   Take 1 Tablet by mouth every day. Birth Control, unknown name  Dose: 1 Tablet     tizanidine 4 MG Tabs  Commonly known as: Zanaflex   Take 4 mg by mouth at bedtime.  Dose: 4 mg            STOP taking these medications      Insulin Glargine-yfgn 100 UNIT/ML Sopn  Replaced by: insulin glargine 100 UNIT/ML Sopn injection     methocarbamol 750 MG Tabs  Commonly known as: Robaxin              Allergies  Allergies   Allergen Reactions    Carbamazepine Rash and Itching    Morphine Vomiting    Tape Rash     Per patient, \"sensitive to tape and bandaids. Result sin contact dermatitis\"       DIET  No orders of the defined types were placed in this encounter.      ACTIVITY  As tolerated.  Weight bearing as tolerated    CONSULTATIONS  Psychiatry     PROCEDURES  None     LABORATORY  Lab Results   Component Value Date    SODIUM 137 08/14/2023    POTASSIUM 3.8 08/14/2023    CHLORIDE 104 08/14/2023    CO2 20 08/14/2023    GLUCOSE 119 (H) 08/14/2023    BUN 9 08/14/2023    CREATININE 0.75 08/14/2023    CREATININE 0.8 10/04/2008        Lab Results   Component Value Date    WBC 8.5 08/13/2023    HEMOGLOBIN 14.2 08/13/2023    HEMATOCRIT 43.0 08/13/2023    PLATELETCT 261 08/13/2023    "     Total time of the discharge process exceeds 35 minutes.

## 2023-08-14 NOTE — CARE PLAN
The patient is Stable - Low risk of patient condition declining or worsening    Shift Goals  Clinical Goals: safety, monitor vitals  Patient Goals: comfort  Family Goals: no family present    Progress made toward(s) clinical / shift goals:       Problem: Knowledge Deficit - Standard  Goal: Patient and family/care givers will demonstrate understanding of plan of care, disease process/condition, diagnostic tests and medications  8/13/2023 2146 by Cat Deluna R.N.  Outcome: Progressing  8/13/2023 2146 by Cat Deluna R.N.  Outcome: Progressing     Problem: Pain - Standard  Goal: Alleviation of pain or a reduction in pain to the patient’s comfort goal  Outcome: Progressing       Patient is not progressing towards the following goals:

## 2023-08-14 NOTE — PROGRESS NOTES
DC instructions reviewed w/ pt , verbalized understanding. PIV dc'd, armband removed.  Meds to bed delivered.

## 2023-08-14 NOTE — CARE PLAN
The patient is Stable - Low risk of patient condition declining or worsening    Shift Goals  Clinical Goals: safety, monitor vitals  Patient Goals: comfort  Family Goals: no family present    Progress made toward(s) clinical / shift goals:    Problem: Knowledge Deficit - Standard  Goal: Patient and family/care givers will demonstrate understanding of plan of care, disease process/condition, diagnostic tests and medications  8/13/2023 1813 by Crystal Beltre R.N.  Outcome: Progressing    Problem: Provide Safe Environment  Goal: Suicide environmental safety, protocols, policies, and practices will be implemented    Problem: Psychosocial  Goal: Patient's ability to identify and develop effective coping behaviors will improve  8/13/2023 1813 by Crystal Beltre R.N.  Outcome: Met    Goal: Patient's ability to identify and utilize available support systems will improve  8/13/2023 1813 by Crystal Beltre R.N.  Outcome: Met    Problem: Pain - Standard  Goal: Alleviation of pain or a reduction in pain to the patient’s comfort goal  8/13/2023 1813 by Crystal Beltre R.N.  Outcome: Progressing     Patient is aware of their plan of care and medications. All safety precautions are in place and pt has been cleared from legal hold.

## 2023-08-14 NOTE — DISCHARGE INSTRUCTIONS
Suicidal Feelings: How to Help Yourself  Suicide is when you end your own life. Suicidal ideation includes expressing thoughts about, or a preoccupation with, ending your own life. There are many things you can do to help yourself feel better when struggling with these feelings. Many services and people are available to support you and others who struggle with similar feelings.  If you ever feel like you may hurt yourself or others, or have thoughts about taking your own life, get help right away. To get help:  Go to your nearest emergency department.  Call your local emergency services (351 in the [place]  Call the [facility] and [facility] services helpline (211 in the [place]  Call or text a suicide hotline to speak with a trained counselor. The following suicide hotlines are available in the [place]:  3-332-958-TALK (1-733.478.2844 or 647 in the [place]  5-047-CDLQJPU (1-711.200.2561).  Text 148911. This is the Crisis Text Line in the [place]  1-479.371.5458. This is a hotline for French speakers.  1-455.388.2324. This is a hotline for TTY users.  1-987-9-U-LIEN (1-634.806.6679). This is a hotline for lesbian, arnold, bisexual, transgender, or questioning youth.  For a list of hotlines in [place], visit suicide.org/hotlines/international/wppbbz-avvaqpy-qhcvgifn.html  Contact a crisis center or a local suicide prevention center. To find a crisis center or suicide prevention center:  Call your local hospital, clinic, community service organization, mental health center, social service provider, or health department. Ask for help with connecting to a crisis center.  For a list of crisis centers in the [place], visit: suicidepreventionlifeline.org  For a list of crisis centers in [place], visit: suicideprevention.ca  How to help yourself feel better    Promise yourself that you will not do anything bad or extreme when you have suicidal feelings. Remember the times you have felt hopeful.  Many people have  gotten through suicidal thoughts and feelings, and you can too.  If you have had these feelings before, remind yourself that you can get through them again.  Let family, friends, teachers, or counselors know how you are feeling. Do not separate yourself from those who care about you and want to help you.  Talk with someone every day, even if you do not feel like talking to anyone or being with other people.  Face-to-face conversation is best to help them understand your feelings.  Contact a mental health care provider and work with this person regularly.  Make a safety plan that you can follow during a crisis.  Include phone numbers of suicide prevention hotlines, mental health professionals, and trusted friends and family members you can call during an emergency.  Save these numbers on your phone.  If you are thinking of taking a lot of medicine, give your medicine to someone who can give it to you as prescribed.  If you are on antidepressants and are concerned you will overdose, tell your health care provider so that he or she can give you safer medicines.  Try to stick to your routines and follow a schedule every day. Make self-care a priority.  Make a list of realistic goals, and cross them off when you achieve them. Accomplishments can give you a sense of worth.  Wait until you are feeling better before doing things that you find difficult or unpleasant.  Do things that you have always enjoyed to take your mind off your feelings.  Try reading a book, or listening to or playing music.  Spending time outside, in nature, may help you feel better.  Follow these instructions at home:    Visit your primary health care provider every year for a physical and a mental health checkup.  Take over-the-counter and prescription medicines only as told by your health care provider.  Ask your health care provider about the possible side effects of any medicines you are taking.  Ask your health care provider about whether  suicidal ideation is a possible side effect of any of your medicines.  Learn about suicidal ideation and what increases the risk for the development of suicidal thoughts.  Eat a well-balanced diet, and eat regular meals.  Get plenty of rest.  Exercise if you are able. Just 30 minutes of exercise each day can help you feel better.  Keep your living space well lit.  Do not use alcohol or drugs. Remove these substances from your home.  General recommendations  Remove weapons, poisons, knives, and other deadly items from your home.  Work with a mental health care provider as needed.  When you are feeling well, write yourself a letter with tips and support that you can read when you are not feeling well.  Remember that life's difficulties can be sorted out with help. Conditions can be treated, and you can learn behaviors and ways of thinking that will help you.  Work with your health care provider or counselor to learn ways of coping with your thoughts and feelings.  Where to find more information  National Suicide Prevention Lifeline: www.suicidepreventionlifeline.org  Hopeline: www.hopeline.ProPerforma  American Foundation for Suicide Prevention: www.afsp.org  The Memo Project (for lesbian, arnold, bisexual, transgender, or questioning youth): www.thetrevorproject.org  National [place] of Mental Health: www.nimh.nih.gov/health/topics/suicide-prevention  Suicide Prevention Resources: afsp.org/suicide-prevention-resources  Contact a health care provider if:  You feel as though you are a burden to others.  You feel agitated, angry, vengeful, or have extreme mood swings.  You have withdrawn from family and friends.  You are frequently using drugs or alcohol.  Get help right away if:  You are talking about suicide or wishing to die.  You start making plans for how to commit suicide.  You feel that you have no reason to live.  You start making plans for putting your affairs in order, saying goodbye, or giving your possessions  away.  You feel guilt, shame, or unbearable pain, and it seems like there is no way out.  You are engaging in risky behaviors that could lead to death.  If you have any of these thoughts or symptoms, get help right away:  Go to your nearest emergency department or crisis center.  Call emergency services (911 in the [place]  Call or text a suicide crisis helpline.  Summary  Suicide is when you take your own life. Suicidal feelings are thoughts about ending your own life.  Promise yourself that you will not do anything bad or extreme when you have suicidal feelings.  Let family, friends, teachers, or counselors know how you are feeling.  Get help right away if you start making plans for how to commit suicide.  This information is not intended to replace advice given to you by your health care provider. Make sure you discuss any questions you have with your health care provider.  Document Revised: 07/14/2022 Document Reviewed: 04/28/2022  Elsevier Patient Education © 2023 [employer]

## (undated) DEVICE — SHEET PEDIATRIC LAPAROTOMY - (10/CA)

## (undated) DEVICE — SPONGE PEANUT - (5/PK 50PK/CA)

## (undated) DEVICE — PACK NEURO - (2EA/CA)

## (undated) DEVICE — SODIUM CHL IRRIGATION 0.9% 1000ML (12EA/CA)

## (undated) DEVICE — SUTURE 4-0 MONOCRYL PLUS PS-2 - 27 INCH (36/BX)

## (undated) DEVICE — SUTURE GENERAL

## (undated) DEVICE — TOWEL STOP TIMEOUT SAFETY FLAG (40EA/CA)

## (undated) DEVICE — TOWELS CLOTH SURGICAL - (4/PK 20PK/CA)

## (undated) DEVICE — KIT EVACUATER 3 SPRING PVC LF 1/8 DRAIN SIZE (10EA/CA)"

## (undated) DEVICE — CHLORAPREP 26 ML APPLICATOR - ORANGE TINT(25/CA)

## (undated) DEVICE — GOWN WARMING STANDARD FLEX - (30/CA)

## (undated) DEVICE — GLOVE BIOGEL INDICATOR SZ 8 SURGICAL PF LTX - (50/BX 4BX/CA)

## (undated) DEVICE — GLOVE BIOGEL PI INDICATOR SZ 6.5 SURGICAL PF LF - (50/BX 4BX/CA)

## (undated) DEVICE — BOVIE BLADE COATED &INSULATED - 25/PK

## (undated) DEVICE — GLOVE BIOGEL SZ 8 SURGICAL PF LTX - (50PR/BX 4BX/CA)

## (undated) DEVICE — TOOL MR8 15CM MATCH HEAD 2.2MM DIAMETER (1/EA)

## (undated) DEVICE — GOWN SURGEONS X-LARGE - DISP. (30/CA)

## (undated) DEVICE — SENSOR OXIMETER ADULT SPO2 RD SET (20EA/BX)

## (undated) DEVICE — NEEDLE SPINAL NON-SAFETY 18 GA X 3 IN (25EA/BX)

## (undated) DEVICE — LACTATED RINGERS INJ 1000 ML - (14EA/CA 60CA/PF)

## (undated) DEVICE — TUBING C&T SET FLYING LEADS DRAIN TUBING (10EA/BX)

## (undated) DEVICE — GLOVE SIZE 7.0 SURGEON ACCELERATOR FREE GREEN (50PR/BX 4BX/CA)

## (undated) DEVICE — DRAPE 36X28IN RAD CARM BND BG - (25/CA) O

## (undated) DEVICE — DRAPE C ARMOR (12EA/CA)

## (undated) DEVICE — CLIP MED INTNL HRZN TI ESCP - (25/BX)

## (undated) DEVICE — SET LEADWIRE 5 LEAD BEDSIDE DISPOSABLE ECG (1SET OF 5/EA)

## (undated) DEVICE — FORCEPS IRRIGATING 8 X 1.5MM (5EA/BX)

## (undated) DEVICE — SCREW DISTRACTION 14MM YELLOW - STERILE (10EA/BX) (5TX4=20)

## (undated) DEVICE — GLOVE BIOGEL SZ 6.5 SURGICAL PF LTX (50PR/BX 4BX/CA)

## (undated) DEVICE — SUTURE 3-0 VICRYL PLUS SH - 8X 18 INCH (12/BX)

## (undated) DEVICE — LACTATED RINGERS INJ. 500 ML - (24EA/CA)

## (undated) DEVICE — TUBING CLEARLINK DUO-VENT - C-FLO (48EA/CA)

## (undated) DEVICE — SET EXTENSION WITH 2 PORTS (48EA/CA) ***PART #2C8610 IS A SUBSTITUTE*****

## (undated) DEVICE — SUCTION INSTRUMENT YANKAUER BULBOUS TIP W/O VENT (50EA/CA)

## (undated) DEVICE — CLIP SM INTNL HRZN TI ESCP LGT - (24EA/PK 25PK/BX)

## (undated) DEVICE — CANISTER SUCTION 3000ML MECHANICAL FILTER AUTO SHUTOFF MEDI-VAC NONSTERILE LF DISP  (40EA/CA)

## (undated) DEVICE — COVER LIGHT HANDLE ALC PLUS DISP (18EA/BX)

## (undated) DEVICE — ELECTRODE DUAL RETURN W/ CORD - (50/PK)

## (undated) DEVICE — KIT SURGIFLO W/OUT THROMBIN - (6EA/CA)

## (undated) DEVICE — SLEEVE, VASO, THIGH, MED